# Patient Record
Sex: FEMALE | Race: BLACK OR AFRICAN AMERICAN | NOT HISPANIC OR LATINO | Employment: UNEMPLOYED | URBAN - METROPOLITAN AREA
[De-identification: names, ages, dates, MRNs, and addresses within clinical notes are randomized per-mention and may not be internally consistent; named-entity substitution may affect disease eponyms.]

---
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
## 2022-02-14 ENCOUNTER — DOCUMENTATION ONLY (OUTPATIENT)
Dept: GASTROENTEROLOGY | Facility: CLINIC | Age: 22
End: 2022-02-14
Payer: MEDICAID

## 2022-02-14 NOTE — Clinical Note
Patient will need an appt during her second trimester. I would like her to see Dr. Matta for this visit. Please schedule.

## 2022-02-14 NOTE — PROGRESS NOTES
Received labs dated 2/9/22  Hbg 11.4  Glucose 106  Sodium 134  Albumin 3.0    She will need a follow up visit during her second trimester and again in her third trimester.

## 2022-02-21 ENCOUNTER — PATIENT MESSAGE (OUTPATIENT)
Dept: GASTROENTEROLOGY | Facility: CLINIC | Age: 22
End: 2022-02-21
Payer: MEDICAID

## 2022-03-15 ENCOUNTER — OFFICE VISIT (OUTPATIENT)
Dept: GASTROENTEROLOGY | Facility: CLINIC | Age: 22
End: 2022-03-15
Payer: MEDICAID

## 2022-03-15 VITALS
HEIGHT: 64 IN | HEART RATE: 103 BPM | BODY MASS INDEX: 27.28 KG/M2 | WEIGHT: 159.81 LBS | DIASTOLIC BLOOD PRESSURE: 78 MMHG | SYSTOLIC BLOOD PRESSURE: 112 MMHG | OXYGEN SATURATION: 99 %

## 2022-03-15 DIAGNOSIS — D84.9 IMMUNOCOMPROMISED STATE: ICD-10-CM

## 2022-03-15 DIAGNOSIS — L30.9 ECZEMA, UNSPECIFIED TYPE: ICD-10-CM

## 2022-03-15 DIAGNOSIS — K50.10 CROHN'S DISEASE OF LARGE INTESTINE WITHOUT COMPLICATION: Primary | ICD-10-CM

## 2022-03-15 DIAGNOSIS — M35.2 BEHCET'S DISEASE: ICD-10-CM

## 2022-03-15 DIAGNOSIS — Z3A.22 22 WEEKS GESTATION OF PREGNANCY: ICD-10-CM

## 2022-03-15 PROCEDURE — 99214 PR OFFICE/OUTPT VISIT, EST, LEVL IV, 30-39 MIN: ICD-10-PCS | Mod: S$PBB,,, | Performed by: NURSE PRACTITIONER

## 2022-03-15 PROCEDURE — 3078F PR MOST RECENT DIASTOLIC BLOOD PRESSURE < 80 MM HG: ICD-10-PCS | Mod: CPTII,,, | Performed by: NURSE PRACTITIONER

## 2022-03-15 PROCEDURE — 1159F MED LIST DOCD IN RCRD: CPT | Mod: CPTII,,, | Performed by: NURSE PRACTITIONER

## 2022-03-15 PROCEDURE — 3008F BODY MASS INDEX DOCD: CPT | Mod: CPTII,,, | Performed by: NURSE PRACTITIONER

## 2022-03-15 PROCEDURE — 3074F SYST BP LT 130 MM HG: CPT | Mod: CPTII,,, | Performed by: NURSE PRACTITIONER

## 2022-03-15 PROCEDURE — 1159F PR MEDICATION LIST DOCUMENTED IN MEDICAL RECORD: ICD-10-PCS | Mod: CPTII,,, | Performed by: NURSE PRACTITIONER

## 2022-03-15 PROCEDURE — 1160F PR REVIEW ALL MEDS BY PRESCRIBER/CLIN PHARMACIST DOCUMENTED: ICD-10-PCS | Mod: CPTII,,, | Performed by: NURSE PRACTITIONER

## 2022-03-15 PROCEDURE — 99999 PR PBB SHADOW E&M-EST. PATIENT-LVL III: ICD-10-PCS | Mod: PBBFAC,,, | Performed by: NURSE PRACTITIONER

## 2022-03-15 PROCEDURE — 99213 OFFICE O/P EST LOW 20 MIN: CPT | Mod: PBBFAC | Performed by: NURSE PRACTITIONER

## 2022-03-15 PROCEDURE — 99214 OFFICE O/P EST MOD 30 MIN: CPT | Mod: S$PBB,,, | Performed by: NURSE PRACTITIONER

## 2022-03-15 PROCEDURE — 99999 PR PBB SHADOW E&M-EST. PATIENT-LVL III: CPT | Mod: PBBFAC,,, | Performed by: NURSE PRACTITIONER

## 2022-03-15 PROCEDURE — 3074F PR MOST RECENT SYSTOLIC BLOOD PRESSURE < 130 MM HG: ICD-10-PCS | Mod: CPTII,,, | Performed by: NURSE PRACTITIONER

## 2022-03-15 PROCEDURE — 3078F DIAST BP <80 MM HG: CPT | Mod: CPTII,,, | Performed by: NURSE PRACTITIONER

## 2022-03-15 PROCEDURE — 3008F PR BODY MASS INDEX (BMI) DOCUMENTED: ICD-10-PCS | Mod: CPTII,,, | Performed by: NURSE PRACTITIONER

## 2022-03-15 PROCEDURE — 1160F RVW MEDS BY RX/DR IN RCRD: CPT | Mod: CPTII,,, | Performed by: NURSE PRACTITIONER

## 2022-03-15 NOTE — PROGRESS NOTES
Clinic Follow Up:  [facility] Follow Up Note    Reason for Follow Up:  The primary encounter diagnosis was Crohn's disease of large intestine without complication. Diagnoses of Behcet's disease, Immunocompromised state, 22 weeks gestation of pregnancy, and Eczema, unspecified type were also pertinent to this visit.    PCP: Georgina Shah       HPI:  This is a 21 y.o. female here for follow up of Crohn's disease and Behcet's disease.      IBD Histroy  - Type: Crohn's disease  - Diagnosed: 2017  - Disease Location: large bowel only   - Surgeries related to IBD: none  - Extra-intestinal Manifestations: rashes-- face, arms, legs, mouth ulcers      Current Medications  Remicade 5 mg/kg every 5 weeks, due for next infusion tomorrow.      [facility]  [address], [place] [address] [phone]     Past Medications  Imuran, started 2015 for Behcet's disease--mouth ulcers, stopped in 2017-- patient discontinued medication     Drug and Antibody Levels   5/10/16Thiopurine metabolite-- low 6TG. undetected 6MMP   5/19/20 Infliximab drug level 6.1  10/1/20 Infliximab 1.2 no AB formation   1/14/21 infliximab 16, no AB formation.      Endoscopy Reports  11/21/17-- EGD: H. Pylori gastritis   11/21/17-- colonoscopy pathology report chronic active colitis with granulomas.   7/2020- no active disease.      Interval History  She is doing well. No complaints of flare. She reports seeing M and she was told that she didn't have to stop Remicade prior to delivery but that it was up to gastroenterology. She is unsure who she saw so will get in touch with her primary OB.      Preventative Medicine     Immunizations  - Influenza: 1/20/21  - Pnemococcal: PCV-13: due, order placed; PSV-23 due   - Hepatitis A/B: ?  - COVID 19- none      Cancer Prevention   - Date of last pap smear: currently pregnant,   - Date of last skin cancer screening: none, recommend yearly  - Date of last surveillance  colonoscopy: 2020, due  for dysplasia screening.      Bone Health  - Vitamin D level: low, takes prenatal vitamin   - Date of last DEXA: NA     Therapy Related Testing  - Date of last TB testin21 quant gold negative   - TPMT status: unable to see report      Miscellaneous  - Vitamin B 12 level (if ileal disease or resection): B12 high, folate normal   - Smoking status: none   - NSAID use: occasionally   - History of C. Diff: none   - Family planning: currently pregnant (22 weeks)     Recent Labs:   Lab Results   Component Value Date    WBC 5.38 2022    HGB 13.1 2022    HCT 38.5 2022     2022    ALT 16 2021    AST 17 2021    BUN 17 2021    CREATININE 0.7 2021    ALBUMIN 4.0 2021     2021    K 3.8 2021    GLU 91 2021     Lab Results   Component Value Date    CRP 0.5 12/10/2020     Lab Results   Component Value Date    HEPBSAB Grayzone (A) 2021    HEPBSAG Negative 2022    HEPBCAB Negative 2021     Lab Results   Component Value Date    POXPRQEF36 1194 (H) 07/15/2020    FOLATE 9.0 07/15/2020     Lab Results   Component Value Date    QHJKPVEV73MH 7 (L) 2021     No results found for: TBGOLD     Review of Systems    Medical History:  Past Medical History:   Diagnosis Date    Behcet recurrent disease     Crohn's colitis        Surgical History:   Past Surgical History:   Procedure Laterality Date    COLON BIOPSY      COLONOSCOPY N/A 2020    Procedure: COLONOSCOPY;  Surgeon: Addy Gupta MD;  Location: [facility];  Service: Pediatrics;  Laterality: N/A;    ESOPHAGOGASTRODUODENOSCOPY N/A 2020    Procedure: EGD (ESOPHAGOGASTRODUODENOSCOPY);  Surgeon: Addy Gupta MD;  Location: [facility];  Service: Pediatrics;  Laterality: N/A;    kidney biopay         Family History:   Family History   Problem Relation Age of Onset    No Known Problems Father     Diabetes Mother        Social  "History:   Social History     Tobacco Use    Smoking status: Never Smoker    Smokeless tobacco: Never Used   Substance Use Topics    Alcohol use: No    Drug use: No       Allergies:   Review of patient's allergies indicates:   Allergen Reactions    Sulfamethoxazole-trimethoprim Swelling    Sulfa (sulfonamide antibiotics)     Amoxicillin Rash    Augmentin [amoxicillin-pot clavulanate] Rash       Home Medications:  Current Outpatient Medications on File Prior to Visit   Medication Sig Dispense Refill    infliximab (REMICADE IV) Inject into the vein.      prenatal vit-iron fum-folic ac (PRENATAL VITAMIN WITH MINERALS) 28 mg iron- 800 mcg Tab Take 1 tablet by mouth once daily. 30 tablet 11    promethazine (PHENERGAN) 25 MG tablet Take 1 tablet (25 mg total) by mouth every 6 (six) hours as needed for Nausea. (Patient not taking: Reported on 3/15/2022) 30 tablet 0     No current facility-administered medications on file prior to visit.       /78 (BP Location: Left arm, Patient Position: Sitting, BP Method: Medium (Manual))   Pulse 103   Ht 5' 4" (1.626 m)   Wt 72.5 kg (159 lb 13.3 oz)   LMP 09/24/2021 (Exact Date)   SpO2 99%   BMI 27.44 kg/m²   Body mass index is 27.44 kg/m².  Physical Exam    Labs: Pertinent labs reviewed.  CRC Screening:     Assessment:   1. Crohn's disease of large intestine without complication    2. Behcet's disease    3. Immunocompromised state    4. 22 weeks gestation of pregnancy    5. Eczema, unspecified type        Recommendations:   - since she gets Remicade infusions every 5 weeks, will discontinue use at 34 weeks gestation. I will contact her OB for M recommendations to continue Remicade throughout pregnancy. Will notify patient if there is a change to the above.   - dermatology referral for eczema as well as skin cancer screening.     Crohn's disease of large intestine without complication  -     Ambulatory referral/consult to Dermatology; Future; Expected date: " 03/22/2022    Behcet's disease    Immunocompromised state    22 weeks gestation of pregnancy    Eczema, unspecified type  -     Ambulatory referral/consult to Dermatology; Future; Expected date: 03/22/2022    Return to Clinic:  Follow up for in third trimester.    Thank you for the opportunity to participate in the care of this patient.  SAMANTHA Medina

## 2022-03-21 ENCOUNTER — DOCUMENTATION ONLY (OUTPATIENT)
Dept: GASTROENTEROLOGY | Facility: CLINIC | Age: 22
End: 2022-03-21
Payer: MEDICAID

## 2022-04-21 ENCOUNTER — OFFICE VISIT (OUTPATIENT)
Dept: DERMATOLOGY | Facility: CLINIC | Age: 22
End: 2022-04-21
Payer: MEDICAID

## 2022-04-21 DIAGNOSIS — K50.10 CROHN'S DISEASE OF LARGE INTESTINE WITHOUT COMPLICATION: ICD-10-CM

## 2022-04-21 DIAGNOSIS — L30.9 ECZEMA, UNSPECIFIED TYPE: ICD-10-CM

## 2022-04-21 PROCEDURE — 99203 OFFICE O/P NEW LOW 30 MIN: CPT | Mod: S$PBB,,, | Performed by: PHYSICIAN ASSISTANT

## 2022-04-21 PROCEDURE — 1159F MED LIST DOCD IN RCRD: CPT | Mod: CPTII,,, | Performed by: PHYSICIAN ASSISTANT

## 2022-04-21 PROCEDURE — 99999 PR PBB SHADOW E&M-EST. PATIENT-LVL III: ICD-10-PCS | Mod: PBBFAC,,, | Performed by: PHYSICIAN ASSISTANT

## 2022-04-21 PROCEDURE — 1159F PR MEDICATION LIST DOCUMENTED IN MEDICAL RECORD: ICD-10-PCS | Mod: CPTII,,, | Performed by: PHYSICIAN ASSISTANT

## 2022-04-21 PROCEDURE — 99999 PR PBB SHADOW E&M-EST. PATIENT-LVL III: CPT | Mod: PBBFAC,,, | Performed by: PHYSICIAN ASSISTANT

## 2022-04-21 PROCEDURE — 99213 OFFICE O/P EST LOW 20 MIN: CPT | Mod: PBBFAC | Performed by: PHYSICIAN ASSISTANT

## 2022-04-21 PROCEDURE — 1160F PR REVIEW ALL MEDS BY PRESCRIBER/CLIN PHARMACIST DOCUMENTED: ICD-10-PCS | Mod: CPTII,,, | Performed by: PHYSICIAN ASSISTANT

## 2022-04-21 PROCEDURE — 1160F RVW MEDS BY RX/DR IN RCRD: CPT | Mod: CPTII,,, | Performed by: PHYSICIAN ASSISTANT

## 2022-04-21 PROCEDURE — 99203 PR OFFICE/OUTPT VISIT, NEW, LEVL III, 30-44 MIN: ICD-10-PCS | Mod: S$PBB,,, | Performed by: PHYSICIAN ASSISTANT

## 2022-04-21 RX ORDER — TRIAMCINOLONE ACETONIDE 0.25 MG/G
CREAM TOPICAL 2 TIMES DAILY
Qty: 30 G | Refills: 0 | Status: SHIPPED | OUTPATIENT
Start: 2022-04-21 | End: 2023-06-06

## 2022-04-21 NOTE — PROGRESS NOTES
Subjective:       Patient ID:  Barbara Mari is a 21 y.o. female who presents for   Chief Complaint   Patient presents with    Skin Check     Pt has Crohn's disease     Hx of Crohn's (on remicade and followed by GI), Behcet's, 26 weeks gestation, referred by GI for derm evaluation of eczema / rash; however, pt denies rash today.  However, she does endorses fine bumps and dryness of the proximal arms, occasional itching, duration 1 + year. Not using emollients, endorses long showers and hot water. Previous tx: aquaphor.    She is on remicade and needs full skin exam. Denies any new or changing lesions.     Reviewed external GI notes       Review of Systems   Constitutional: Negative for fever and chills.   Gastrointestinal: Negative for nausea and vomiting.   Skin: Positive for itching, dry skin and activity-related sunscreen use. Negative for rash, sun sensitivity, daily sunscreen use, recent sunburn, dry lips and abscesses.   Hematologic/Lymphatic: Does not bruise/bleed easily.        Objective:    Physical Exam   Constitutional: She appears well-developed and well-nourished. No distress.   Genitourinary:         Neurological: She is alert and oriented to person, place, and time. She is not disoriented.   Psychiatric: She has a normal mood and affect.   Skin:   Areas Examined (abnormalities noted in diagram):   Scalp / Hair Palpated and Inspected  Head / Face Inspection Performed  Neck Inspection Performed  Chest / Axilla Inspection Performed  Abdomen Inspection Performed  Genitals / Buttocks / Groin Inspection Performed  Back Inspection Performed  RUE Inspected  LUE Inspection Performed  RLE Inspected  LLE Inspection Performed  Nails and Digits Inspection Performed              Diagram Legend     Erythematous scaling macule/papule c/w actinic keratosis       Vascular papule c/w angioma      Pigmented verrucoid papule/plaque c/w seborrheic keratosis      Yellow umbilicated papule c/w sebaceous hyperplasia       Irregularly shaped tan macule c/w lentigo     1-2 mm smooth white papules consistent with Milia      Movable subcutaneous cyst with punctum c/w epidermal inclusion cyst      Subcutaneous movable cyst c/w pilar cyst      Firm pink to brown papule c/w dermatofibroma      Pedunculated fleshy papule(s) c/w skin tag(s)      Evenly pigmented macule c/w junctional nevus     Mildly variegated pigmented, slightly irregular-bordered macule c/w mildly atypical nevus      Flesh colored to evenly pigmented papule c/w intradermal nevus       Pink pearly papule/plaque c/w basal cell carcinoma      Erythematous hyperkeratotic cursted plaque c/w SCC      Surgical scar with no sign of skin cancer recurrence      Open and closed comedones      Inflammatory papules and pustules      Verrucoid papule consistent consistent with wart     Erythematous eczematous patches and plaques     Dystrophic onycholytic nail with subungual debris c/w onychomycosis     Umbilicated papule    Erythematous-base heme-crusted tan verrucoid plaque consistent with inflamed seborrheic keratosis     Erythematous Silvery Scaling Plaque c/w Psoriasis     See annotation      Assessment / Plan:       Eczema, unspecified type  -     Ambulatory referral/consult to Dermatology  -     triamcinolone acetonide 0.025% (KENALOG) 0.025 % cream; Apply topically 2 (two) times daily.  Dispense: 30 g; Refill: 0  Mild, ddx: KP vs other eczematous vs. less likely photosensitivity   Trial of above rx, gentle skin care and liberal emollients. May usse above rx for up to 4 weeks.     Crohn's disease of large intestine without complication  -     Ambulatory referral/consult to Dermatology  No lesions of concern today on exam. Encouraged regular skin exams for surveillance. Discussed potential for increased risk of skin CA related to immune suppression (remicade).               Follow up in about 6 months (around 10/21/2022).

## 2022-04-22 ENCOUNTER — PATIENT MESSAGE (OUTPATIENT)
Dept: GASTROENTEROLOGY | Facility: CLINIC | Age: 22
End: 2022-04-22
Payer: MEDICAID

## 2022-04-22 ENCOUNTER — DOCUMENTATION ONLY (OUTPATIENT)
Dept: GASTROENTEROLOGY | Facility: CLINIC | Age: 22
End: 2022-04-22
Payer: MEDICAID

## 2022-04-22 NOTE — PROGRESS NOTES
Received and reviewed medical records from [facility]  - hgb decreased but patient is now pregnant. She needs to make sure she is taking a prenatal vitamin with iron.

## 2022-04-25 NOTE — TELEPHONE ENCOUNTER
Spoke to patient. I informed her of the results. She verbalized understanding and will get a prenatal vitamin with iron in it.

## 2022-05-10 ENCOUNTER — OFFICE VISIT (OUTPATIENT)
Dept: GASTROENTEROLOGY | Facility: CLINIC | Age: 22
End: 2022-05-10
Payer: MEDICAID

## 2022-05-10 VITALS
DIASTOLIC BLOOD PRESSURE: 60 MMHG | HEIGHT: 64 IN | RESPIRATION RATE: 14 BRPM | HEART RATE: 66 BPM | WEIGHT: 170.19 LBS | BODY MASS INDEX: 29.06 KG/M2 | SYSTOLIC BLOOD PRESSURE: 112 MMHG

## 2022-05-10 DIAGNOSIS — D84.9 IMMUNOCOMPROMISED STATE: ICD-10-CM

## 2022-05-10 DIAGNOSIS — M35.2 BEHCET'S DISEASE: ICD-10-CM

## 2022-05-10 DIAGNOSIS — Z3A.30 30 WEEKS GESTATION OF PREGNANCY: ICD-10-CM

## 2022-05-10 DIAGNOSIS — K50.10 CROHN'S DISEASE OF LARGE INTESTINE WITHOUT COMPLICATION: Primary | ICD-10-CM

## 2022-05-10 PROCEDURE — 99213 PR OFFICE/OUTPT VISIT, EST, LEVL III, 20-29 MIN: ICD-10-PCS | Mod: S$PBB,,, | Performed by: NURSE PRACTITIONER

## 2022-05-10 PROCEDURE — 3008F BODY MASS INDEX DOCD: CPT | Mod: CPTII,,, | Performed by: NURSE PRACTITIONER

## 2022-05-10 PROCEDURE — 99999 PR PBB SHADOW E&M-EST. PATIENT-LVL III: ICD-10-PCS | Mod: PBBFAC,,, | Performed by: NURSE PRACTITIONER

## 2022-05-10 PROCEDURE — 3008F PR BODY MASS INDEX (BMI) DOCUMENTED: ICD-10-PCS | Mod: CPTII,,, | Performed by: NURSE PRACTITIONER

## 2022-05-10 PROCEDURE — 1160F PR REVIEW ALL MEDS BY PRESCRIBER/CLIN PHARMACIST DOCUMENTED: ICD-10-PCS | Mod: CPTII,,, | Performed by: NURSE PRACTITIONER

## 2022-05-10 PROCEDURE — 3078F PR MOST RECENT DIASTOLIC BLOOD PRESSURE < 80 MM HG: ICD-10-PCS | Mod: CPTII,,, | Performed by: NURSE PRACTITIONER

## 2022-05-10 PROCEDURE — 1159F PR MEDICATION LIST DOCUMENTED IN MEDICAL RECORD: ICD-10-PCS | Mod: CPTII,,, | Performed by: NURSE PRACTITIONER

## 2022-05-10 PROCEDURE — 1159F MED LIST DOCD IN RCRD: CPT | Mod: CPTII,,, | Performed by: NURSE PRACTITIONER

## 2022-05-10 PROCEDURE — 99213 OFFICE O/P EST LOW 20 MIN: CPT | Mod: PBBFAC | Performed by: NURSE PRACTITIONER

## 2022-05-10 PROCEDURE — 99213 OFFICE O/P EST LOW 20 MIN: CPT | Mod: S$PBB,,, | Performed by: NURSE PRACTITIONER

## 2022-05-10 PROCEDURE — 3078F DIAST BP <80 MM HG: CPT | Mod: CPTII,,, | Performed by: NURSE PRACTITIONER

## 2022-05-10 PROCEDURE — 3074F SYST BP LT 130 MM HG: CPT | Mod: CPTII,,, | Performed by: NURSE PRACTITIONER

## 2022-05-10 PROCEDURE — 99999 PR PBB SHADOW E&M-EST. PATIENT-LVL III: CPT | Mod: PBBFAC,,, | Performed by: NURSE PRACTITIONER

## 2022-05-10 PROCEDURE — 1160F RVW MEDS BY RX/DR IN RCRD: CPT | Mod: CPTII,,, | Performed by: NURSE PRACTITIONER

## 2022-05-10 PROCEDURE — 3074F PR MOST RECENT SYSTOLIC BLOOD PRESSURE < 130 MM HG: ICD-10-PCS | Mod: CPTII,,, | Performed by: NURSE PRACTITIONER

## 2022-05-10 NOTE — PROGRESS NOTES
Clinic Follow Up:  [facility] Follow Up Note    Reason for Follow Up:  The primary encounter diagnosis was Crohn's disease of large intestine without complication. Diagnoses of Behcet's disease, Immunocompromised state, and 30 weeks gestation of pregnancy were also pertinent to this visit.    PCP: Georgina Shah       HPI:  This is a 21 y.o. female here for follow up of Crohn's disease.   She is due for her next Remicade infusion on 5/25 or 5/26.   She is now in her third trimester and she continues to do well. No current GI symptoms. No abdominal pain, hematochezia, melena, nausea, vomiting, or weight loss.    We discussed discontinuing Remicade at 34 weeks and to restart immediately after delivery pending no infection. She saw M (she does not recall the providers name) and was told she could continue Remicade throughout pregnancy and not hold before delivery.     Review of Systems   Constitutional: Negative for activity change and appetite change.        As per interval history above   Respiratory: Negative for cough and shortness of breath.    Cardiovascular: Negative for chest pain.   Gastrointestinal: Negative for abdominal pain, anal bleeding, blood in stool, constipation, diarrhea, nausea, rectal pain and vomiting.   Skin: Negative for color change and rash.       Medical History:  Past Medical History:   Diagnosis Date    Behcet recurrent disease     Crohn's colitis        Surgical History:   Past Surgical History:   Procedure Laterality Date    COLON BIOPSY      COLONOSCOPY N/A 7/7/2020    Procedure: COLONOSCOPY;  Surgeon: Addy Gupta MD;  Location: [facility];  Service: Pediatrics;  Laterality: N/A;    ESOPHAGOGASTRODUODENOSCOPY N/A 7/7/2020    Procedure: EGD (ESOPHAGOGASTRODUODENOSCOPY);  Surgeon: Addy Gupta MD;  Location: [facility];  Service: Pediatrics;  Laterality: N/A;    kidney biopay         Family History:   Family History   Problem Relation Age of Onset    No  "Known Problems Father     Diabetes Mother        Social History:   Social History     Tobacco Use    Smoking status: Never Smoker    Smokeless tobacco: Never Used   Substance Use Topics    Alcohol use: No    Drug use: No       Allergies:   Review of patient's allergies indicates:   Allergen Reactions    Sulfamethoxazole-trimethoprim Swelling    Sulfa (sulfonamide antibiotics)     Amoxicillin Rash    Augmentin [amoxicillin-pot clavulanate] Rash       Home Medications:  Current Outpatient Medications on File Prior to Visit   Medication Sig Dispense Refill    infliximab (REMICADE IV) Inject into the vein.      prenatal vit-iron fum-folic ac (PRENATAL VITAMIN WITH MINERALS) 28 mg iron- 800 mcg Tab Take 1 tablet by mouth once daily. 30 tablet 11    promethazine (PHENERGAN) 25 MG tablet Take 1 tablet (25 mg total) by mouth every 6 (six) hours as needed for Nausea. 30 tablet 0    triamcinolone acetonide 0.025% (KENALOG) 0.025 % cream Apply topically 2 (two) times daily. 30 g 0     No current facility-administered medications on file prior to visit.       /60   Pulse 66   Resp 14   Ht 5' 4" (1.626 m)   Wt 77.2 kg (170 lb 3.1 oz)   LMP 09/24/2021 (Exact Date)   BMI 29.21 kg/m²   Body mass index is 29.21 kg/m².  Physical Exam  Constitutional:       General: She is not in acute distress.  HENT:      Head: Normocephalic and atraumatic.   Eyes:      General: No scleral icterus.     Conjunctiva/sclera: Conjunctivae normal.   Cardiovascular:      Rate and Rhythm: Normal rate and regular rhythm.      Heart sounds: No murmur heard.  Pulmonary:      Effort: Pulmonary effort is normal. No respiratory distress.      Breath sounds: Normal breath sounds. No wheezing.   Abdominal:      General: Abdomen is flat. Bowel sounds are normal.      Palpations: Abdomen is soft.      Tenderness: There is no abdominal tenderness.   Skin:     General: Skin is warm and dry.   Neurological:      General: No focal deficit " present.      Mental Status: She is alert and oriented to person, place, and time.      Cranial Nerves: No cranial nerve deficit.   Psychiatric:         Mood and Affect: Mood normal.         Judgment: Judgment normal.         Labs: Pertinent labs reviewed.    Assessment:   1. Crohn's disease of large intestine without complication    2. Behcet's disease    3. Immunocompromised state    4. 30 weeks gestation of pregnancy      Patient with crohn's disease in remission on Remicade. She is asymptomatic.     Recommendations:   - continue regular OB appts  - hold Remicade at 34 weeks.   - message sent to OB regarding MFM recommendations to continue Remicade. Will notify patient once I have discussed with Dr. Camilo.     Crohn's disease of large intestine without complication    Behcet's disease    Immunocompromised state    30 weeks gestation of pregnancy      Thank you for the opportunity to participate in the care of this patient.  SAMANTHA Medina

## 2022-05-31 ENCOUNTER — DOCUMENTATION ONLY (OUTPATIENT)
Dept: GASTROENTEROLOGY | Facility: CLINIC | Age: 22
End: 2022-05-31
Payer: MEDICAID

## 2022-07-14 ENCOUNTER — TELEPHONE (OUTPATIENT)
Dept: GASTROENTEROLOGY | Facility: CLINIC | Age: 22
End: 2022-07-14
Payer: MEDICAID

## 2022-07-14 NOTE — TELEPHONE ENCOUNTER
Spoke to [facility]. They are needing updated clinicals for this patient. Clinicals faxed to [phone]. Fax confirmation received.

## 2022-07-14 NOTE — TELEPHONE ENCOUNTER
----- Message from Christiano Todd MA sent at 7/14/2022  3:39 PM CDT -----    ----- Message -----  From: Destini Qiu  Sent: 7/14/2022   3:37 PM CDT  To: Celestina Recio calling from [facility] Re: Clinical's please call her [phone] with information    Thanks bs

## 2022-07-21 ENCOUNTER — TELEPHONE (OUTPATIENT)
Dept: GASTROENTEROLOGY | Facility: CLINIC | Age: 22
End: 2022-07-21
Payer: MEDICAID

## 2022-07-21 NOTE — TELEPHONE ENCOUNTER
----- Message from Cosmo De La Paz sent at 7/20/2022  3:14 PM CDT -----  Contact: [id number]  Calling requesting clinical notes for pt , need it for authorization for pt medication , also states she have reached out before and no one has called back . Please all back at [phone] . Thanks/dj

## 2022-07-29 ENCOUNTER — PATIENT MESSAGE (OUTPATIENT)
Dept: GASTROENTEROLOGY | Facility: CLINIC | Age: 22
End: 2022-07-29
Payer: MEDICAID

## 2022-08-08 ENCOUNTER — DOCUMENTATION ONLY (OUTPATIENT)
Dept: GASTROENTEROLOGY | Facility: CLINIC | Age: 22
End: 2022-08-08
Payer: MEDICAID

## 2022-09-15 ENCOUNTER — LAB VISIT (OUTPATIENT)
Dept: LAB | Facility: HOSPITAL | Age: 22
End: 2022-09-15
Attending: NURSE PRACTITIONER
Payer: MEDICAID

## 2022-09-15 ENCOUNTER — OFFICE VISIT (OUTPATIENT)
Dept: GASTROENTEROLOGY | Facility: CLINIC | Age: 22
End: 2022-09-15
Payer: MEDICAID

## 2022-09-15 VITALS
SYSTOLIC BLOOD PRESSURE: 122 MMHG | BODY MASS INDEX: 28.79 KG/M2 | WEIGHT: 168.63 LBS | HEIGHT: 64 IN | HEART RATE: 101 BPM | DIASTOLIC BLOOD PRESSURE: 64 MMHG

## 2022-09-15 DIAGNOSIS — K50.10 CROHN'S DISEASE OF LARGE INTESTINE WITHOUT COMPLICATION: Primary | ICD-10-CM

## 2022-09-15 DIAGNOSIS — D84.9 IMMUNOCOMPROMISED STATE: ICD-10-CM

## 2022-09-15 DIAGNOSIS — M35.2 BEHCET'S DISEASE: ICD-10-CM

## 2022-09-15 DIAGNOSIS — K50.10 CROHN'S DISEASE OF LARGE INTESTINE WITHOUT COMPLICATION: ICD-10-CM

## 2022-09-15 LAB
ALBUMIN SERPL BCP-MCNC: 3.6 G/DL (ref 3.5–5.2)
ALP SERPL-CCNC: 80 U/L (ref 55–135)
ALT SERPL W/O P-5'-P-CCNC: 15 U/L (ref 10–44)
ANION GAP SERPL CALC-SCNC: 8 MMOL/L (ref 8–16)
AST SERPL-CCNC: 17 U/L (ref 10–40)
BASOPHILS # BLD AUTO: 0.01 K/UL (ref 0–0.2)
BASOPHILS NFR BLD: 0.2 % (ref 0–1.9)
BILIRUB SERPL-MCNC: 0.8 MG/DL (ref 0.1–1)
BUN SERPL-MCNC: 12 MG/DL (ref 6–20)
CALCIUM SERPL-MCNC: 9.2 MG/DL (ref 8.7–10.5)
CHLORIDE SERPL-SCNC: 104 MMOL/L (ref 95–110)
CO2 SERPL-SCNC: 25 MMOL/L (ref 23–29)
CREAT SERPL-MCNC: 0.8 MG/DL (ref 0.5–1.4)
DIFFERENTIAL METHOD: ABNORMAL
EOSINOPHIL # BLD AUTO: 0 K/UL (ref 0–0.5)
EOSINOPHIL NFR BLD: 0.4 % (ref 0–8)
ERYTHROCYTE [DISTWIDTH] IN BLOOD BY AUTOMATED COUNT: 15 % (ref 11.5–14.5)
EST. GFR  (NO RACE VARIABLE): >60 ML/MIN/1.73 M^2
GLUCOSE SERPL-MCNC: 118 MG/DL (ref 70–110)
HCT VFR BLD AUTO: 35.7 % (ref 37–48.5)
HGB BLD-MCNC: 11.1 G/DL (ref 12–16)
IMM GRANULOCYTES # BLD AUTO: 0.01 K/UL (ref 0–0.04)
IMM GRANULOCYTES NFR BLD AUTO: 0.2 % (ref 0–0.5)
LYMPHOCYTES # BLD AUTO: 1.3 K/UL (ref 1–4.8)
LYMPHOCYTES NFR BLD: 26.7 % (ref 18–48)
MCH RBC QN AUTO: 27.1 PG (ref 27–31)
MCHC RBC AUTO-ENTMCNC: 31.1 G/DL (ref 32–36)
MCV RBC AUTO: 87 FL (ref 82–98)
MONOCYTES # BLD AUTO: 0.3 K/UL (ref 0.3–1)
MONOCYTES NFR BLD: 6.4 % (ref 4–15)
NEUTROPHILS # BLD AUTO: 3.2 K/UL (ref 1.8–7.7)
NEUTROPHILS NFR BLD: 66.1 % (ref 38–73)
NRBC BLD-RTO: 0 /100 WBC
PLATELET # BLD AUTO: 277 K/UL (ref 150–450)
PMV BLD AUTO: 10.5 FL (ref 9.2–12.9)
POTASSIUM SERPL-SCNC: 3.8 MMOL/L (ref 3.5–5.1)
PROT SERPL-MCNC: 7.2 G/DL (ref 6–8.4)
RBC # BLD AUTO: 4.09 M/UL (ref 4–5.4)
SODIUM SERPL-SCNC: 137 MMOL/L (ref 136–145)
WBC # BLD AUTO: 4.83 K/UL (ref 3.9–12.7)

## 2022-09-15 PROCEDURE — 99999 PR PBB SHADOW E&M-EST. PATIENT-LVL III: ICD-10-PCS | Mod: PBBFAC,,, | Performed by: NURSE PRACTITIONER

## 2022-09-15 PROCEDURE — 85025 COMPLETE CBC W/AUTO DIFF WBC: CPT | Performed by: NURSE PRACTITIONER

## 2022-09-15 PROCEDURE — 3078F PR MOST RECENT DIASTOLIC BLOOD PRESSURE < 80 MM HG: ICD-10-PCS | Mod: CPTII,,, | Performed by: NURSE PRACTITIONER

## 2022-09-15 PROCEDURE — 99213 OFFICE O/P EST LOW 20 MIN: CPT | Mod: PBBFAC | Performed by: NURSE PRACTITIONER

## 2022-09-15 PROCEDURE — 82746 ASSAY OF FOLIC ACID SERUM: CPT | Performed by: NURSE PRACTITIONER

## 2022-09-15 PROCEDURE — 1160F PR REVIEW ALL MEDS BY PRESCRIBER/CLIN PHARMACIST DOCUMENTED: ICD-10-PCS | Mod: CPTII,,, | Performed by: NURSE PRACTITIONER

## 2022-09-15 PROCEDURE — 99214 PR OFFICE/OUTPT VISIT, EST, LEVL IV, 30-39 MIN: ICD-10-PCS | Mod: S$PBB,,, | Performed by: NURSE PRACTITIONER

## 2022-09-15 PROCEDURE — 80053 COMPREHEN METABOLIC PANEL: CPT | Performed by: NURSE PRACTITIONER

## 2022-09-15 PROCEDURE — 99999 PR PBB SHADOW E&M-EST. PATIENT-LVL III: CPT | Mod: PBBFAC,,, | Performed by: NURSE PRACTITIONER

## 2022-09-15 PROCEDURE — 1159F PR MEDICATION LIST DOCUMENTED IN MEDICAL RECORD: ICD-10-PCS | Mod: CPTII,,, | Performed by: NURSE PRACTITIONER

## 2022-09-15 PROCEDURE — 82607 VITAMIN B-12: CPT | Performed by: NURSE PRACTITIONER

## 2022-09-15 PROCEDURE — 3078F DIAST BP <80 MM HG: CPT | Mod: CPTII,,, | Performed by: NURSE PRACTITIONER

## 2022-09-15 PROCEDURE — 3074F SYST BP LT 130 MM HG: CPT | Mod: CPTII,,, | Performed by: NURSE PRACTITIONER

## 2022-09-15 PROCEDURE — 3074F PR MOST RECENT SYSTOLIC BLOOD PRESSURE < 130 MM HG: ICD-10-PCS | Mod: CPTII,,, | Performed by: NURSE PRACTITIONER

## 2022-09-15 PROCEDURE — 3008F PR BODY MASS INDEX (BMI) DOCUMENTED: ICD-10-PCS | Mod: CPTII,,, | Performed by: NURSE PRACTITIONER

## 2022-09-15 PROCEDURE — 1159F MED LIST DOCD IN RCRD: CPT | Mod: CPTII,,, | Performed by: NURSE PRACTITIONER

## 2022-09-15 PROCEDURE — 36415 COLL VENOUS BLD VENIPUNCTURE: CPT | Performed by: NURSE PRACTITIONER

## 2022-09-15 PROCEDURE — 86480 TB TEST CELL IMMUN MEASURE: CPT | Performed by: NURSE PRACTITIONER

## 2022-09-15 PROCEDURE — 99214 OFFICE O/P EST MOD 30 MIN: CPT | Mod: S$PBB,,, | Performed by: NURSE PRACTITIONER

## 2022-09-15 PROCEDURE — 3008F BODY MASS INDEX DOCD: CPT | Mod: CPTII,,, | Performed by: NURSE PRACTITIONER

## 2022-09-15 PROCEDURE — 1160F RVW MEDS BY RX/DR IN RCRD: CPT | Mod: CPTII,,, | Performed by: NURSE PRACTITIONER

## 2022-09-15 PROCEDURE — 82306 VITAMIN D 25 HYDROXY: CPT | Performed by: NURSE PRACTITIONER

## 2022-09-15 RX ORDER — NORGESTIMATE AND ETHINYL ESTRADIOL 7DAYSX3 LO
1 KIT ORAL DAILY
COMMUNITY
End: 2022-09-15 | Stop reason: SDUPTHER

## 2022-09-15 RX ORDER — INFLIXIMAB 100 MG/10ML
INJECTION, POWDER, LYOPHILIZED, FOR SOLUTION INTRAVENOUS
COMMUNITY
Start: 2022-02-14 | End: 2023-08-14

## 2022-09-15 RX ORDER — NORGESTIMATE AND ETHINYL ESTRADIOL 7DAYSX3 LO
KIT ORAL
COMMUNITY
End: 2023-06-06

## 2022-09-15 NOTE — PATIENT INSTRUCTIONS
Follow high fiber diet and can use Miralax as needed.     Fiber, along with fluid intake, can help your digestive tract function properly. A high-fiber diet can also help reduce the risk of obesity, heart disease, and diabetes.   Women should try to eat at least 21-25 grams of fiber a day. Men should try to eat at least 30-38 grams of fiber a day.   See the chart below for some of the more common foods and how much fiber they contain per serving. You should also drink at least 64 ounces of water per day when following a high fiber diet. When buying packaged foods, check the nutrition food label for fiber content.     Amount of fiber in different foods  Food Serving Grams of fiber   Fruits   Apple (with skin) 1 medium apple 4.4   Banana 1 medium banana 3.1   Oranges 1 orange 3.1          Prunes 1 cup, pitted 12.4   Juices   Apple, unsweetened, with added ascorbic acid 1 cup 0.5   Grapefruit, white, canned, sweetened 1 cup 0.2   Grape, unsweetened, with added ascorbic acid 1 cup 0.5          Orange 1 cup 0.7   Vegetables   Cooked    Green beans 1 cup 4.0    Carrots 1/2 cup sliced 2.3   Peas 1 cup 8.8   Potato (baked, with skin) 1 medium potato 3.8   Raw   Cucumber (with peel) 1 cucumber 1.5   Lettuce 1 cup shredded 0.5   Tomato 1 medium tomato 1.5   Spinach 1 cup 0.7   Legumes   Baked beans, canned, no salt added 1 cup 13.9   Kidney beans, canned 1 cup 13.6   Lima beans, canned 1 cup 11.6               Lentils, boiled 1 cup 15.6   Breads, pastas, flours   Bran muffins 1 medium muffin 5.2   Oatmeal, cooked 1 cup 4.0   White bread 1 slice 0.6   Whole-wheat bread 1 slice 1.9   Pasta and rice, cooked   Macaroni 1 cup 2.5   Rice, brown 1 cup 3.5   Rice, white 1 cup 0.6               Spaghetti (regular) 1 cup 2.5   Nuts   Almonds 1/2 cup 8.7   Peanuts 1/2 cup 7.9     To learn how much fiber and other nutrients are in different foods, visit the [place] Department of Agriculture (USDA) FoodData Central website.  Data  from: USDA FoodData Central. Available at: https://fdc.nal.usda.gov/ (Accessed on October 11, 2019).  Graphic 20665 Version 6.0  © 2022 UpToDate, Inc. and/or its affiliates. All Rights Reserved.

## 2022-09-16 DIAGNOSIS — E55.9 VITAMIN D DEFICIENCY: Primary | ICD-10-CM

## 2022-09-16 LAB
25(OH)D3+25(OH)D2 SERPL-MCNC: 14 NG/ML (ref 30–96)
FOLATE SERPL-MCNC: 15.4 NG/ML (ref 4–24)
VIT B12 SERPL-MCNC: 464 PG/ML (ref 210–950)

## 2022-09-16 RX ORDER — ASPIRIN 325 MG
50000 TABLET, DELAYED RELEASE (ENTERIC COATED) ORAL WEEKLY
Qty: 12 CAPSULE | Refills: 0 | Status: SHIPPED | OUTPATIENT
Start: 2022-09-16 | End: 2022-12-15

## 2022-09-17 LAB
GAMMA INTERFERON BACKGROUND BLD IA-ACNC: 0.02 IU/ML
M TB IFN-G CD4+ BCKGRND COR BLD-ACNC: 0.01 IU/ML
MITOGEN IGNF BCKGRD COR BLD-ACNC: 7.07 IU/ML
TB GOLD PLUS: NEGATIVE
TB2 - NIL: 0 IU/ML

## 2022-09-19 ENCOUNTER — TREATMENT PLANNING (OUTPATIENT)
Dept: GASTROENTEROLOGY | Facility: CLINIC | Age: 22
End: 2022-09-19
Payer: MEDICAID

## 2022-09-20 ENCOUNTER — PATIENT MESSAGE (OUTPATIENT)
Dept: GASTROENTEROLOGY | Facility: CLINIC | Age: 22
End: 2022-09-20
Payer: MEDICAID

## 2022-09-21 NOTE — PROGRESS NOTES
IBD Conference / Treatment Planning    Patient's case discussed by: ABHISHEK Shearer, CHAYOP, and Sonia Cortes, RN, BSN    Dx: Crohn's    Reason to Review: Patient is starting new job and wanting to change biologic due to not being able to get infusions every 5 weeks    Plan: Change infusions to 5mg/kg every 8 weeks  Get trough drug levels before 3rd dose at [facility]

## 2022-09-26 ENCOUNTER — PATIENT MESSAGE (OUTPATIENT)
Dept: GASTROENTEROLOGY | Facility: CLINIC | Age: 22
End: 2022-09-26
Payer: MEDICAID

## 2022-10-04 DIAGNOSIS — K50.10 CROHN'S DISEASE OF LARGE INTESTINE WITHOUT COMPLICATION: Primary | ICD-10-CM

## 2022-11-18 ENCOUNTER — DOCUMENTATION ONLY (OUTPATIENT)
Dept: GASTROENTEROLOGY | Facility: CLINIC | Age: 22
End: 2022-11-18
Payer: MEDICAID

## 2023-01-17 ENCOUNTER — PATIENT MESSAGE (OUTPATIENT)
Dept: GASTROENTEROLOGY | Facility: CLINIC | Age: 23
End: 2023-01-17
Payer: MEDICAID

## 2023-01-30 ENCOUNTER — DOCUMENTATION ONLY (OUTPATIENT)
Dept: GASTROENTEROLOGY | Facility: CLINIC | Age: 23
End: 2023-01-30
Payer: MEDICAID

## 2023-01-30 NOTE — PROGRESS NOTES
Received lab results from [facility] . CBC/CMP drawn on 1/5/23  Hgb 11.0  HCT 35.3  Otherwise essentially normal

## 2023-02-08 DIAGNOSIS — K50.10 CROHN'S DISEASE OF LARGE INTESTINE WITHOUT COMPLICATION: Primary | ICD-10-CM

## 2023-02-08 NOTE — PROGRESS NOTES
Pt coming for drug level and infusion labs on 3/1/23 the day before her scheduled 3rd every 8 week infusion at [facility] on 3/2/23.

## 2023-03-01 ENCOUNTER — LAB VISIT (OUTPATIENT)
Dept: LAB | Facility: HOSPITAL | Age: 23
End: 2023-03-01
Attending: NURSE PRACTITIONER
Payer: MEDICAID

## 2023-03-01 DIAGNOSIS — K50.10 CROHN'S DISEASE OF LARGE INTESTINE WITHOUT COMPLICATION: ICD-10-CM

## 2023-03-01 PROCEDURE — 36415 COLL VENOUS BLD VENIPUNCTURE: CPT | Performed by: NURSE PRACTITIONER

## 2023-03-01 PROCEDURE — 80230 DRUG ASSAY INFLIXIMAB: CPT | Performed by: NURSE PRACTITIONER

## 2023-03-05 LAB
INFLIXIMAB DRUG LEVEL: 8.8 MCG/ML
INFLIXIMAB INTERPRETATION: NORMAL

## 2023-03-22 ENCOUNTER — TELEPHONE (OUTPATIENT)
Dept: GASTROENTEROLOGY | Facility: CLINIC | Age: 23
End: 2023-03-22
Payer: MEDICAID

## 2023-03-22 NOTE — TELEPHONE ENCOUNTER
----- Message from Nesha Clark sent at 3/22/2023 10:40 AM CDT -----  Contact: pt  Pt is calling in regard to having paperwork signed for her surgery.  Please call her back at [phone] thanks/mpd

## 2023-03-22 NOTE — TELEPHONE ENCOUNTER
Patient states she is having surgery on 4/12. Her last infusion was 3/9 and next infusion is 5/4. She is asking if anything needs to be moved? I informed her typically we do not want infusions within 2 weeks of surgery but hers are not so she should be fine. I will verify with Rita Oscar NP and let her know. She verbalized understanding.

## 2023-03-22 NOTE — TELEPHONE ENCOUNTER
Pt requesting a surgical clearance form completed for breast augmentation surgery scheduled on 04/12/23

## 2023-03-22 NOTE — TELEPHONE ENCOUNTER
----- Message from Sonia Cortes RN sent at 3/22/2023 10:18 AM CDT -----  Contact: Barbara    ----- Message -----  From: Morena Bender  Sent: 3/22/2023   7:44 AM CDT  To: Celestina Salinas Staff    Patient is calling to speak with a nurse regarding surgery. Patient reports scheduled for a breast reduction surgery on 4/12/23 and request to be informed when infusion should be, in regards to surgery. Please give patient a call back at [phone] to assist.     Thank you,  GH

## 2023-03-30 ENCOUNTER — TELEPHONE (OUTPATIENT)
Dept: GASTROENTEROLOGY | Facility: CLINIC | Age: 23
End: 2023-03-30
Payer: MEDICAID

## 2023-03-30 NOTE — TELEPHONE ENCOUNTER
----- Message from Cosmo De La Paz sent at 3/30/2023  9:50 AM CDT -----  Contact: [id number]  Calling regarding pt medication/surgery . Please call back at [phone] or [phone]. Thanksdj

## 2023-03-30 NOTE — TELEPHONE ENCOUNTER
Guerrero from Dr. Ma office asked about patient's infusions with the timing of her surgery on 4/12. I informed her patient should not have infusions within 2 weeks of her infusions. She verbalized understanding. She states they typically give patients Toradol at discharge but patient is not able to have NSAIDs. I agreed and stated patient can have Tylenol. She asked if Gabapentin and Percocet was okay. I informed her that we do not typically like them to take narcotics but that should be fine for the small timeframe she is taking it. She verbalized understanding.

## 2023-05-24 ENCOUNTER — TELEPHONE (OUTPATIENT)
Dept: GASTROENTEROLOGY | Facility: CLINIC | Age: 23
End: 2023-05-24
Payer: MEDICAID

## 2023-05-24 ENCOUNTER — PATIENT MESSAGE (OUTPATIENT)
Dept: GASTROENTEROLOGY | Facility: CLINIC | Age: 23
End: 2023-05-24
Payer: MEDICAID

## 2023-05-24 NOTE — TELEPHONE ENCOUNTER
Infusion orders sent to [facility] and orders scanned into chart. Messaged patient that [facility] would be reaching out .     Jaleesa Cancino, PharmD , [facility]  Clinical Pharmacist Gastroenterology  [facility] [place]

## 2023-05-24 NOTE — TELEPHONE ENCOUNTER
Received a call from [facility] infusion regarding clarification of this patients Remicade orders. Patient was at [facility]. [facility] has closed , so [facility] transferred the [facility] to [facility]. I have returned the call to [facility] 3 times at the number provided [phone] and it states person is unavailable. I prepared external infusion orders for [facility] for this patient due to ease of communication with this infusion center.    Jaleesa Cancino, PharmD , [facility]  Clinical Pharmacist Gastroenterology  [facility] [place]

## 2023-06-01 ENCOUNTER — TELEPHONE (OUTPATIENT)
Dept: GASTROENTEROLOGY | Facility: CLINIC | Age: 23
End: 2023-06-01
Payer: MEDICAID

## 2023-06-01 NOTE — TELEPHONE ENCOUNTER
Faxed completed and signed Remicade infusion orders to [facility] and attached a copy to chart .    Jaleesa Cancino, PharmD , AAHIVP  Clinical Pharmacist Gastroenterology  [facility] [place]

## 2023-06-05 ENCOUNTER — TELEPHONE (OUTPATIENT)
Dept: PHARMACY | Facility: HOSPITAL | Age: 23
End: 2023-06-05
Payer: MEDICAID

## 2023-06-05 NOTE — TELEPHONE ENCOUNTER
[facility] faxed us stating the PA for Remicade ahd been initiated . Awaiting determination.     Jaleesa Cancino, PharmD , AAHIVP  Clinical Pharmacist Gastroenterology  [facility] [place]

## 2023-06-08 ENCOUNTER — TELEPHONE (OUTPATIENT)
Dept: GASTROENTEROLOGY | Facility: CLINIC | Age: 23
End: 2023-06-08
Payer: MEDICAID

## 2023-06-08 NOTE — TELEPHONE ENCOUNTER
Flower from [employer] called asking for clinical documentation (telephone encounter, office note, etc.) stating patient is doing well on Entyvio. Faxed office note from 9/2022 to [phone].    [facility] called later stating Entyvio has been approved for another year.

## 2023-07-07 ENCOUNTER — TELEPHONE (OUTPATIENT)
Dept: GASTROENTEROLOGY | Facility: CLINIC | Age: 23
End: 2023-07-07
Payer: MEDICAID

## 2023-07-07 NOTE — TELEPHONE ENCOUNTER
Spoke to [facility]. They state patient has not been scheduled for the Remicade infusion yet. They forwarded me to Kate Reeves, , to see the status of the PA. No answer, left a voicemail for a call back.

## 2023-07-11 ENCOUNTER — PATIENT MESSAGE (OUTPATIENT)
Dept: GASTROENTEROLOGY | Facility: CLINIC | Age: 23
End: 2023-07-11
Payer: MEDICAID

## 2023-07-27 ENCOUNTER — PATIENT MESSAGE (OUTPATIENT)
Dept: ADMINISTRATIVE | Facility: OTHER | Age: 23
End: 2023-07-27
Payer: MEDICAID

## 2023-08-02 ENCOUNTER — PATIENT MESSAGE (OUTPATIENT)
Dept: GASTROENTEROLOGY | Facility: CLINIC | Age: 23
End: 2023-08-02
Payer: MEDICAID

## 2023-08-03 ENCOUNTER — LAB VISIT (OUTPATIENT)
Dept: LAB | Facility: HOSPITAL | Age: 23
End: 2023-08-03
Attending: NURSE PRACTITIONER
Payer: MEDICAID

## 2023-08-03 ENCOUNTER — TELEPHONE (OUTPATIENT)
Dept: DERMATOLOGY | Facility: CLINIC | Age: 23
End: 2023-08-03
Payer: MEDICAID

## 2023-08-03 ENCOUNTER — OFFICE VISIT (OUTPATIENT)
Dept: GASTROENTEROLOGY | Facility: CLINIC | Age: 23
End: 2023-08-03
Payer: MEDICAID

## 2023-08-03 VITALS
HEIGHT: 64 IN | DIASTOLIC BLOOD PRESSURE: 86 MMHG | SYSTOLIC BLOOD PRESSURE: 118 MMHG | WEIGHT: 175.69 LBS | BODY MASS INDEX: 29.99 KG/M2

## 2023-08-03 DIAGNOSIS — L73.2 HIDRADENITIS: ICD-10-CM

## 2023-08-03 DIAGNOSIS — Z3A.23 23 WEEKS GESTATION OF PREGNANCY: ICD-10-CM

## 2023-08-03 DIAGNOSIS — M35.2 BEHCET'S DISEASE: ICD-10-CM

## 2023-08-03 DIAGNOSIS — K50.10 CROHN'S DISEASE OF LARGE INTESTINE WITHOUT COMPLICATION: ICD-10-CM

## 2023-08-03 DIAGNOSIS — K50.10 CROHN'S DISEASE OF LARGE INTESTINE WITHOUT COMPLICATION: Primary | ICD-10-CM

## 2023-08-03 LAB
25(OH)D3+25(OH)D2 SERPL-MCNC: 13 NG/ML (ref 30–96)
ALBUMIN SERPL BCP-MCNC: 2.9 G/DL (ref 3.5–5.2)
ALP SERPL-CCNC: 69 U/L (ref 55–135)
ALT SERPL W/O P-5'-P-CCNC: 7 U/L (ref 10–44)
ANION GAP SERPL CALC-SCNC: 11 MMOL/L (ref 8–16)
AST SERPL-CCNC: 10 U/L (ref 10–40)
BASOPHILS # BLD AUTO: 0.01 K/UL (ref 0–0.2)
BASOPHILS NFR BLD: 0.1 % (ref 0–1.9)
BILIRUB SERPL-MCNC: 0.5 MG/DL (ref 0.1–1)
BUN SERPL-MCNC: 10 MG/DL (ref 6–20)
CALCIUM SERPL-MCNC: 9.2 MG/DL (ref 8.7–10.5)
CHLORIDE SERPL-SCNC: 105 MMOL/L (ref 95–110)
CO2 SERPL-SCNC: 18 MMOL/L (ref 23–29)
CREAT SERPL-MCNC: 0.6 MG/DL (ref 0.5–1.4)
CRP SERPL-MCNC: 17.1 MG/L (ref 0–8.2)
DIFFERENTIAL METHOD: ABNORMAL
EOSINOPHIL # BLD AUTO: 0 K/UL (ref 0–0.5)
EOSINOPHIL NFR BLD: 0.3 % (ref 0–8)
ERYTHROCYTE [DISTWIDTH] IN BLOOD BY AUTOMATED COUNT: 16.1 % (ref 11.5–14.5)
ERYTHROCYTE [SEDIMENTATION RATE] IN BLOOD BY PHOTOMETRIC METHOD: 52 MM/HR (ref 0–36)
EST. GFR  (NO RACE VARIABLE): >60 ML/MIN/1.73 M^2
FOLATE SERPL-MCNC: 8.5 NG/ML (ref 4–24)
GLUCOSE SERPL-MCNC: 79 MG/DL (ref 70–110)
HBV CORE AB SERPL QL IA: NORMAL
HBV SURFACE AB SER-ACNC: 118.54 MIU/ML
HBV SURFACE AB SER-ACNC: REACTIVE M[IU]/ML
HBV SURFACE AG SERPL QL IA: NORMAL
HCT VFR BLD AUTO: 33 % (ref 37–48.5)
HGB BLD-MCNC: 10.5 G/DL (ref 12–16)
IMM GRANULOCYTES # BLD AUTO: 0.03 K/UL (ref 0–0.04)
IMM GRANULOCYTES NFR BLD AUTO: 0.3 % (ref 0–0.5)
LYMPHOCYTES # BLD AUTO: 1.2 K/UL (ref 1–4.8)
LYMPHOCYTES NFR BLD: 13 % (ref 18–48)
MCH RBC QN AUTO: 27.5 PG (ref 27–31)
MCHC RBC AUTO-ENTMCNC: 31.8 G/DL (ref 32–36)
MCV RBC AUTO: 86 FL (ref 82–98)
MONOCYTES # BLD AUTO: 0.6 K/UL (ref 0.3–1)
MONOCYTES NFR BLD: 6.7 % (ref 4–15)
NEUTROPHILS # BLD AUTO: 7.1 K/UL (ref 1.8–7.7)
NEUTROPHILS NFR BLD: 79.6 % (ref 38–73)
NRBC BLD-RTO: 0 /100 WBC
PLATELET # BLD AUTO: 253 K/UL (ref 150–450)
PMV BLD AUTO: 11.1 FL (ref 9.2–12.9)
POTASSIUM SERPL-SCNC: 3.6 MMOL/L (ref 3.5–5.1)
PROT SERPL-MCNC: 7.1 G/DL (ref 6–8.4)
RBC # BLD AUTO: 3.82 M/UL (ref 4–5.4)
SODIUM SERPL-SCNC: 134 MMOL/L (ref 136–145)
VIT B12 SERPL-MCNC: 249 PG/ML (ref 210–950)
WBC # BLD AUTO: 8.86 K/UL (ref 3.9–12.7)

## 2023-08-03 PROCEDURE — 3079F PR MOST RECENT DIASTOLIC BLOOD PRESSURE 80-89 MM HG: ICD-10-PCS | Mod: CPTII,,, | Performed by: NURSE PRACTITIONER

## 2023-08-03 PROCEDURE — 85652 RBC SED RATE AUTOMATED: CPT | Performed by: NURSE PRACTITIONER

## 2023-08-03 PROCEDURE — 99999 PR PBB SHADOW E&M-EST. PATIENT-LVL III: CPT | Mod: PBBFAC,,, | Performed by: NURSE PRACTITIONER

## 2023-08-03 PROCEDURE — 1160F RVW MEDS BY RX/DR IN RCRD: CPT | Mod: CPTII,,, | Performed by: NURSE PRACTITIONER

## 2023-08-03 PROCEDURE — 82306 VITAMIN D 25 HYDROXY: CPT | Performed by: NURSE PRACTITIONER

## 2023-08-03 PROCEDURE — 80053 COMPREHEN METABOLIC PANEL: CPT | Performed by: NURSE PRACTITIONER

## 2023-08-03 PROCEDURE — 86480 TB TEST CELL IMMUN MEASURE: CPT | Performed by: NURSE PRACTITIONER

## 2023-08-03 PROCEDURE — 3079F DIAST BP 80-89 MM HG: CPT | Mod: CPTII,,, | Performed by: NURSE PRACTITIONER

## 2023-08-03 PROCEDURE — 36415 COLL VENOUS BLD VENIPUNCTURE: CPT | Performed by: NURSE PRACTITIONER

## 2023-08-03 PROCEDURE — 86140 C-REACTIVE PROTEIN: CPT | Performed by: NURSE PRACTITIONER

## 2023-08-03 PROCEDURE — 3074F PR MOST RECENT SYSTOLIC BLOOD PRESSURE < 130 MM HG: ICD-10-PCS | Mod: CPTII,,, | Performed by: NURSE PRACTITIONER

## 2023-08-03 PROCEDURE — 3008F BODY MASS INDEX DOCD: CPT | Mod: CPTII,,, | Performed by: NURSE PRACTITIONER

## 2023-08-03 PROCEDURE — 87340 HEPATITIS B SURFACE AG IA: CPT | Performed by: NURSE PRACTITIONER

## 2023-08-03 PROCEDURE — 1160F PR REVIEW ALL MEDS BY PRESCRIBER/CLIN PHARMACIST DOCUMENTED: ICD-10-PCS | Mod: CPTII,,, | Performed by: NURSE PRACTITIONER

## 2023-08-03 PROCEDURE — 3008F PR BODY MASS INDEX (BMI) DOCUMENTED: ICD-10-PCS | Mod: CPTII,,, | Performed by: NURSE PRACTITIONER

## 2023-08-03 PROCEDURE — 99214 OFFICE O/P EST MOD 30 MIN: CPT | Mod: S$PBB,,, | Performed by: NURSE PRACTITIONER

## 2023-08-03 PROCEDURE — 99214 PR OFFICE/OUTPT VISIT, EST, LEVL IV, 30-39 MIN: ICD-10-PCS | Mod: S$PBB,,, | Performed by: NURSE PRACTITIONER

## 2023-08-03 PROCEDURE — 85025 COMPLETE CBC W/AUTO DIFF WBC: CPT | Performed by: NURSE PRACTITIONER

## 2023-08-03 PROCEDURE — 82607 VITAMIN B-12: CPT | Performed by: NURSE PRACTITIONER

## 2023-08-03 PROCEDURE — 99213 OFFICE O/P EST LOW 20 MIN: CPT | Mod: PBBFAC | Performed by: NURSE PRACTITIONER

## 2023-08-03 PROCEDURE — 99999 PR PBB SHADOW E&M-EST. PATIENT-LVL III: ICD-10-PCS | Mod: PBBFAC,,, | Performed by: NURSE PRACTITIONER

## 2023-08-03 PROCEDURE — 82746 ASSAY OF FOLIC ACID SERUM: CPT | Performed by: NURSE PRACTITIONER

## 2023-08-03 PROCEDURE — 1159F MED LIST DOCD IN RCRD: CPT | Mod: CPTII,,, | Performed by: NURSE PRACTITIONER

## 2023-08-03 PROCEDURE — 86704 HEP B CORE ANTIBODY TOTAL: CPT | Performed by: NURSE PRACTITIONER

## 2023-08-03 PROCEDURE — 86706 HEP B SURFACE ANTIBODY: CPT | Mod: 91 | Performed by: NURSE PRACTITIONER

## 2023-08-03 PROCEDURE — 1159F PR MEDICATION LIST DOCUMENTED IN MEDICAL RECORD: ICD-10-PCS | Mod: CPTII,,, | Performed by: NURSE PRACTITIONER

## 2023-08-03 PROCEDURE — 3074F SYST BP LT 130 MM HG: CPT | Mod: CPTII,,, | Performed by: NURSE PRACTITIONER

## 2023-08-03 NOTE — TELEPHONE ENCOUNTER
----- Message from Bailey Snider sent at 8/3/2023  4:40 PM CDT -----  Regarding: Referral      What is the request in detail: Please call pt to schedule Derm referral.Please advise.    Can the clinic reply by [facility]? No    Best Call Back Number: [phone]       Additional Information:

## 2023-08-03 NOTE — PROGRESS NOTES
Clinic Follow Up:  [facility] Follow Up Note    Reason for Follow Up:  The primary encounter diagnosis was Crohn's disease of large intestine without complication. Diagnoses of Behcet's disease, Hidradenitis, and 23 weeks gestation of pregnancy were also pertinent to this visit.    PCP: Georgina Shah       HPI:  This is a 23 y.o. female here for follow up.   Patient with Crohn's disease and Bechets. Has been in remission on Remicade. She stopped getting Remicade with last done on 3/9/23. She was getting her infusions at [facility] but it closed down. We attempted to get her infusions transferred over to [facility] but was denied and under appeal.   She is currently 23 weeks pregnant.   She is not having any gut issues at this time but says she has abscesses over each axilla.     She started a new job and is not able to take off any for the next 90 days and works the hours of 9:00- 5:00.     Review of Systems   Constitutional:  Negative for activity change and appetite change.        As per interval history above   Respiratory:  Negative for cough and shortness of breath.    Cardiovascular:  Negative for chest pain.   Gastrointestinal:  Negative for abdominal pain, blood in stool, diarrhea, nausea, rectal pain and vomiting.   Skin:  Negative for color change and rash.        abscess       Medical History:  Past Medical History:   Diagnosis Date    Behcet recurrent disease     Crohn's colitis     PIH (pregnancy induced hypertension)     Preeclampsia     G1       Surgical History:   Past Surgical History:   Procedure Laterality Date    COLON BIOPSY      COLONOSCOPY N/A 07/07/2020    Procedure: COLONOSCOPY;  Surgeon: Addy Gupta MD;  Location: [facility];  Service: Pediatrics;  Laterality: N/A;    ESOPHAGOGASTRODUODENOSCOPY N/A 07/07/2020    Procedure: EGD (ESOPHAGOGASTRODUODENOSCOPY);  Surgeon: Addy Gupta MD;  Location: [facility];  Service: Pediatrics;  Laterality: N/A;  "      Family History:   Family History   Problem Relation Age of Onset    No Known Problems Father     Diabetes Mother        Social History:   Social History     Tobacco Use    Smoking status: Never    Smokeless tobacco: Never   Substance Use Topics    Alcohol use: Not Currently    Drug use: No       Allergies:   Review of patient's allergies indicates:   Allergen Reactions    Sulfamethoxazole-trimethoprim Swelling    Penicillins     Triamterene-hydrochlorothiazid      Other reaction(s): Not Indicated    Trimethoprim Hives    Amoxicillin Rash    Augmentin [amoxicillin-pot clavulanate] Rash    Clavulanic acid Rash       Home Medications:  Current Outpatient Medications on File Prior to Visit   Medication Sig Dispense Refill    inFLIXimab (REMICADE) 100 mg injection .COMPLEX      prenatal vit no.124/iron/folic (PRENATAL VITAMIN ORAL) Take by mouth.      prenatal vit-iron fum-folic ac (PRENATAL VITAMIN WITH MINERALS) 28 mg iron- 800 mcg Tab Take 1 tablet by mouth once daily. 30 tablet 11     No current facility-administered medications on file prior to visit.       /86 (BP Location: Left arm, Patient Position: Sitting, BP Method: Small (Manual))   Ht 5' 4" (1.626 m)   Wt 79.7 kg (175 lb 11.3 oz)   LMP 02/12/2023 (Exact Date)   BMI 30.16 kg/m²   Body mass index is 30.16 kg/m².  Physical Exam  Constitutional:       General: She is not in acute distress.  HENT:      Head: Normocephalic.   Musculoskeletal:      Comments: Hidradenitis to bilateral axilla    Neurological:      General: No focal deficit present.      Mental Status: She is alert.   Psychiatric:         Mood and Affect: Mood normal.         Judgment: Judgment normal.     Labs: Pertinent labs reviewed.    Assessment:   1. Crohn's disease of large intestine without complication    2. Behcet's disease    3. Hidradenitis    4. 23 weeks gestation of pregnancy    Needs to restart biologic. She does not feel she will be able to do remicade infusions given " her work schedule. New diagnosis of HS over each axilla. Because of this, we should treat with anti-TNF.     Recommendations:   - referral to [facility] dermat[facility]  - will discuss with IBD team but will likely use Humira.     Crohn's disease of large intestine without complication  -     Cancel: Ambulatory referral/consult to Dermatology; Future; Expected date: 08/10/2023  -     Ambulatory referral/consult to Dermatology; Future; Expected date: 08/10/2023  -     CBC Auto Differential; Future; Expected date: 08/03/2023  -     Comprehensive Metabolic Panel; Future; Expected date: 08/03/2023  -     C-Reactive Protein; Future; Expected date: 08/03/2023  -     Sedimentation rate; Future; Expected date: 08/03/2023  -     Vitamin D; Future; Expected date: 08/03/2023  -     Vitamin B12; Future; Expected date: 08/03/2023  -     Folate; Future; Expected date: 08/03/2023  -     Quantiferon Gold TB; Future; Expected date: 08/03/2023  -     Hepatitis B Surface Ab, Qualitative; Future; Expected date: 08/03/2023  -     Hepatitis B Surface Antigen; Future; Expected date: 08/03/2023  -     Hepatitis B Core Antibody, Total; Future; Expected date: 08/03/2023    Behcet's disease    Hidradenitis  -     Cancel: Ambulatory referral/consult to Dermatology; Future; Expected date: 08/10/2023  -     Ambulatory referral/consult to Dermatology; Future; Expected date: 08/10/2023    23 weeks gestation of pregnancy    Return to Clinic:  No follow-ups on file.    Thank you for the opportunity to participate in the care of this patient.  SAMANTHA Medina

## 2023-08-04 DIAGNOSIS — E55.9 VITAMIN D DEFICIENCY: Primary | ICD-10-CM

## 2023-08-04 RX ORDER — ASPIRIN 325 MG
50000 TABLET, DELAYED RELEASE (ENTERIC COATED) ORAL WEEKLY
Qty: 12 CAPSULE | Refills: 3 | Status: SHIPPED | OUTPATIENT
Start: 2023-08-04 | End: 2023-08-23 | Stop reason: SDUPTHER

## 2023-08-05 LAB
GAMMA INTERFERON BACKGROUND BLD IA-ACNC: 0.06 IU/ML
M TB IFN-G CD4+ BCKGRND COR BLD-ACNC: -0.01 IU/ML
M TB IFN-G CD4+ BCKGRND COR BLD-ACNC: -0.02 IU/ML
MITOGEN IGNF BCKGRD COR BLD-ACNC: 1 IU/ML
TB GOLD PLUS: NEGATIVE

## 2023-08-07 ENCOUNTER — TREATMENT PLANNING (OUTPATIENT)
Dept: GASTROENTEROLOGY | Facility: CLINIC | Age: 23
End: 2023-08-07
Payer: MEDICAID

## 2023-08-07 DIAGNOSIS — K50.10 CROHN'S DISEASE OF LARGE INTESTINE WITHOUT COMPLICATION: Primary | ICD-10-CM

## 2023-08-08 ENCOUNTER — PATIENT MESSAGE (OUTPATIENT)
Dept: GASTROENTEROLOGY | Facility: CLINIC | Age: 23
End: 2023-08-08
Payer: MEDICAID

## 2023-08-08 NOTE — PROGRESS NOTES
IBD Conference / Treatment Planning    Patient's case discussed by: ABHISHEK Shearer, CHAYOP, Jaleesa Cancino, PharmD, and Sonia Cortes, RN, BSN    Dx: Crohn's Disease    Reason to Review: Patient 24 weeks pregnant. Last infusion was in March 2023. Patient is experiencing abscesses on bilateral axillaries - concerns for HS.    Plan: Switch to Humira  Repeat calprotectin  Request patient to send pictures through the portal  [facility] in [facility]

## 2023-08-08 NOTE — PROGRESS NOTES
Addendum:   Needs to see dermatology ASAP. Have reached out with HS clinic who recommends local dermatology with [facility] for evaluation. I have reached out to our Dermatology clinic and are trying to get the patient seen soon. Will plant to start Humira now. Encouraged her to upload pictures to her portal for documentation in the chart.

## 2023-08-14 RX ORDER — ADALIMUMAB 80MG/0.8ML
KIT SUBCUTANEOUS
Qty: 3 PEN | Refills: 0 | Status: ACTIVE | OUTPATIENT
Start: 2023-08-14 | End: 2023-09-13 | Stop reason: DRUGHIGH

## 2023-08-14 RX ORDER — ADALIMUMAB 40MG/0.4ML
40 KIT SUBCUTANEOUS
Qty: 2 PEN | Refills: 2 | Status: ACTIVE | OUTPATIENT
Start: 2023-08-14 | End: 2023-11-06

## 2023-08-15 ENCOUNTER — TELEPHONE (OUTPATIENT)
Dept: PHARMACY | Facility: CLINIC | Age: 23
End: 2023-08-15
Payer: MEDICAID

## 2023-08-15 NOTE — TELEPHONE ENCOUNTER
Hello, this is David Felix, clinical pharmacist with [facility] that is part of your care team.  We have begun working on your prescription that your doctor has sent us. Our next steps include:     Working with your insurance company to obtain approval for your medication  Working with you to ensure your medication is affordable     We will be calling you along the way with updates on your medication but if you have any concerns or receive information that you would like to discuss please reach us at [phone].    Welcome call outcome: Patient/caregiver reached

## 2023-08-18 ENCOUNTER — SPECIALTY PHARMACY (OUTPATIENT)
Dept: PHARMACY | Facility: CLINIC | Age: 23
End: 2023-08-18
Payer: MEDICAID

## 2023-08-23 DIAGNOSIS — E55.9 VITAMIN D DEFICIENCY: ICD-10-CM

## 2023-08-23 RX ORDER — ASPIRIN 325 MG
50000 TABLET, DELAYED RELEASE (ENTERIC COATED) ORAL WEEKLY
Qty: 12 CAPSULE | Refills: 3 | Status: SHIPPED | OUTPATIENT
Start: 2023-08-23 | End: 2023-09-27 | Stop reason: SDUPTHER

## 2023-08-31 ENCOUNTER — PATIENT MESSAGE (OUTPATIENT)
Dept: GASTROENTEROLOGY | Facility: CLINIC | Age: 23
End: 2023-08-31
Payer: MEDICAID

## 2023-09-27 ENCOUNTER — OFFICE VISIT (OUTPATIENT)
Dept: GASTROENTEROLOGY | Facility: CLINIC | Age: 23
End: 2023-09-27
Payer: MEDICAID

## 2023-09-27 VITALS
WEIGHT: 177.69 LBS | BODY MASS INDEX: 30.34 KG/M2 | HEART RATE: 104 BPM | SYSTOLIC BLOOD PRESSURE: 106 MMHG | DIASTOLIC BLOOD PRESSURE: 72 MMHG | HEIGHT: 64 IN

## 2023-09-27 DIAGNOSIS — K50.10 CROHN'S DISEASE OF COLON WITHOUT COMPLICATION: Primary | ICD-10-CM

## 2023-09-27 DIAGNOSIS — E55.9 VITAMIN D DEFICIENCY: ICD-10-CM

## 2023-09-27 DIAGNOSIS — D84.9 IMMUNOCOMPROMISED STATE: ICD-10-CM

## 2023-09-27 PROCEDURE — 3078F PR MOST RECENT DIASTOLIC BLOOD PRESSURE < 80 MM HG: ICD-10-PCS | Mod: CPTII,,, | Performed by: NURSE PRACTITIONER

## 2023-09-27 PROCEDURE — 3074F PR MOST RECENT SYSTOLIC BLOOD PRESSURE < 130 MM HG: ICD-10-PCS | Mod: CPTII,,, | Performed by: NURSE PRACTITIONER

## 2023-09-27 PROCEDURE — 99999 PR PBB SHADOW E&M-EST. PATIENT-LVL III: CPT | Mod: PBBFAC,,, | Performed by: NURSE PRACTITIONER

## 2023-09-27 PROCEDURE — 3008F BODY MASS INDEX DOCD: CPT | Mod: CPTII,,, | Performed by: NURSE PRACTITIONER

## 2023-09-27 PROCEDURE — 1159F MED LIST DOCD IN RCRD: CPT | Mod: CPTII,,, | Performed by: NURSE PRACTITIONER

## 2023-09-27 PROCEDURE — 99999 PR PBB SHADOW E&M-EST. PATIENT-LVL III: ICD-10-PCS | Mod: PBBFAC,,, | Performed by: NURSE PRACTITIONER

## 2023-09-27 PROCEDURE — 1159F PR MEDICATION LIST DOCUMENTED IN MEDICAL RECORD: ICD-10-PCS | Mod: CPTII,,, | Performed by: NURSE PRACTITIONER

## 2023-09-27 PROCEDURE — 3078F DIAST BP <80 MM HG: CPT | Mod: CPTII,,, | Performed by: NURSE PRACTITIONER

## 2023-09-27 PROCEDURE — 99214 OFFICE O/P EST MOD 30 MIN: CPT | Mod: S$PBB,,, | Performed by: NURSE PRACTITIONER

## 2023-09-27 PROCEDURE — 3008F PR BODY MASS INDEX (BMI) DOCUMENTED: ICD-10-PCS | Mod: CPTII,,, | Performed by: NURSE PRACTITIONER

## 2023-09-27 PROCEDURE — 99214 PR OFFICE/OUTPT VISIT, EST, LEVL IV, 30-39 MIN: ICD-10-PCS | Mod: S$PBB,,, | Performed by: NURSE PRACTITIONER

## 2023-09-27 PROCEDURE — 1160F PR REVIEW ALL MEDS BY PRESCRIBER/CLIN PHARMACIST DOCUMENTED: ICD-10-PCS | Mod: CPTII,,, | Performed by: NURSE PRACTITIONER

## 2023-09-27 PROCEDURE — 3074F SYST BP LT 130 MM HG: CPT | Mod: CPTII,,, | Performed by: NURSE PRACTITIONER

## 2023-09-27 PROCEDURE — 1160F RVW MEDS BY RX/DR IN RCRD: CPT | Mod: CPTII,,, | Performed by: NURSE PRACTITIONER

## 2023-09-27 PROCEDURE — 99213 OFFICE O/P EST LOW 20 MIN: CPT | Mod: PBBFAC | Performed by: NURSE PRACTITIONER

## 2023-09-27 RX ORDER — ACETAMINOPHEN 500 MG
TABLET ORAL
COMMUNITY
Start: 2023-07-06 | End: 2024-02-05

## 2023-09-27 RX ORDER — ASPIRIN 325 MG
50000 TABLET, DELAYED RELEASE (ENTERIC COATED) ORAL WEEKLY
Qty: 12 CAPSULE | Refills: 3 | Status: SHIPPED | OUTPATIENT
Start: 2023-09-27 | End: 2024-02-05

## 2023-09-27 NOTE — PROGRESS NOTES
Clinic Follow Up:  [facility] Follow Up Note    Reason for Follow Up:  The primary encounter diagnosis was Crohn's disease of colon without complication. Diagnoses of Vitamin D deficiency and Immunocompromised state were also pertinent to this visit.    PCP: Georgina Shah       HPI:  This is a 23 y.o. female here for follow up of Crohn's disease.   She is currently pregnant.   She restarted Humira 8/23/23. She was due for her first maintenance dose yesterday but has not received her Humira as of yet. It looks like [facility] has tried to reach her but was unable to do so. She does report some improvement in her HS.     Review of Systems   Constitutional:  Negative for activity change and appetite change.        As per interval history above   Respiratory:  Negative for cough and shortness of breath.    Cardiovascular:  Negative for chest pain.   Gastrointestinal:  Negative for abdominal pain, blood in stool, constipation, diarrhea, nausea and vomiting.   Skin:  Negative for color change and rash.       Medical History:  Past Medical History:   Diagnosis Date    Behcet recurrent disease     Crohn's colitis     PIH (pregnancy induced hypertension)     Preeclampsia     G1       Surgical History:   Past Surgical History:   Procedure Laterality Date    COLON BIOPSY      COLONOSCOPY N/A 07/07/2020    Procedure: COLONOSCOPY;  Surgeon: Addy Gupta MD;  Location: [facility];  Service: Pediatrics;  Laterality: N/A;    ESOPHAGOGASTRODUODENOSCOPY N/A 07/07/2020    Procedure: EGD (ESOPHAGOGASTRODUODENOSCOPY);  Surgeon: Addy Gupta MD;  Location: [facility];  Service: Pediatrics;  Laterality: N/A;       Family History:   Family History   Problem Relation Age of Onset    No Known Problems Father     Diabetes Mother        Social History:   Social History     Tobacco Use    Smoking status: Never    Smokeless tobacco: Never   Substance Use Topics    Alcohol use: Not Currently    Drug use: No       Allergies:  "  Review of patient's allergies indicates:   Allergen Reactions    Sulfamethoxazole-trimethoprim Swelling    Penicillins     Triamterene-hydrochlorothiazid      Other reaction(s): Not Indicated    Trimethoprim Hives    Amoxicillin Rash    Augmentin [amoxicillin-pot clavulanate] Rash    Clavulanic acid Rash       Home Medications:  Current Outpatient Medications on File Prior to Visit   Medication Sig Dispense Refill    acetaminophen (TYLENOL EXTRA STRENGTH) 500 MG tablet Take 2 tablets every 6 hours by oral route as needed for 5 days.      prenatal vit no.124/iron/folic (PRENATAL VITAMIN ORAL) Take by mouth.      prenatal vit-iron fum-folic ac (PRENATAL VITAMIN WITH MINERALS) 28 mg iron- 800 mcg Tab Take 1 tablet by mouth once daily. 30 tablet 11    [DISCONTINUED] cholecalciferol, vitamin D3, 1,250 mcg (50,000 unit) capsule Take 1 capsule (50,000 Units total) by mouth once a week. 12 capsule 3    ferrous sulfate (IRON, FERROUS SULFATE,) 325 mg (65 mg iron) Tab tablet Take 1 tablet (325 mg total) by mouth daily with breakfast. (Patient not taking: Reported on 9/27/2023) 30 tablet 4    nystatin-triamcinolone (MYCOLOG) ointment Apply topically 2 (two) times daily. Apply externally for inching/irratation for 7 days 30 g 2     No current facility-administered medications on file prior to visit.       /72 (BP Location: Left arm, Patient Position: Sitting, BP Method: Medium (Manual))   Pulse 104   Ht 5' 4" (1.626 m)   Wt 80.6 kg (177 lb 11.1 oz)   LMP 02/12/2023 (Exact Date)   BMI 30.50 kg/m²   Body mass index is 30.5 kg/m².  Physical Exam  Constitutional:       General: She is not in acute distress.  HENT:      Head: Normocephalic.   Neurological:      General: No focal deficit present.      Mental Status: She is alert.   Psychiatric:         Mood and Affect: Mood normal.         Judgment: Judgment normal.         Labs: Pertinent labs reviewed.    Assessment:   1. Crohn's disease of colon without complication  "   2. Vitamin D deficiency    3. Immunocompromised state        Recommendations:   She will contact [facility] today to schedule shipment of Humira  Continue Humira  Needs vitamin d resent     Crohn's disease of colon without complication    Vitamin D deficiency  -     cholecalciferol, vitamin D3, 1,250 mcg (50,000 unit) capsule; Take 1 capsule (50,000 Units total) by mouth once a week.  Dispense: 12 capsule; Refill: 3    Immunocompromised state    Return to Clinic:  3 months     Thank you for the opportunity to participate in the care of this patient.  SAMANTHA Medina

## 2023-09-27 NOTE — Clinical Note
Due for first maintenance yesterday. [facility] having difficult time getting in touch with her for shipping. I told her she must contact them today and gave [facility] number.  She missed her [facility] derm appt a couple of weeks ago. Needs it rescheduled. She says she is off on Fridays.

## 2023-10-26 ENCOUNTER — PATIENT MESSAGE (OUTPATIENT)
Dept: GASTROENTEROLOGY | Facility: CLINIC | Age: 23
End: 2023-10-26
Payer: MEDICAID

## 2023-11-20 ENCOUNTER — PATIENT MESSAGE (OUTPATIENT)
Dept: GASTROENTEROLOGY | Facility: CLINIC | Age: 23
End: 2023-11-20
Payer: MEDICAID

## 2023-12-27 NOTE — PROGRESS NOTES
Clinic Follow Up:  [facility] Follow Up Note    Reason for Follow Up:  The primary encounter diagnosis was Crohn's disease of large intestine without complication. A diagnosis of Immunocompromised state was also pertinent to this visit.    PCP: Georgina Shah       HPI:  This is a 22 y.o. female here for follow up of Crohn's disease.     IBD Histroy  - Type: Crohn's disease  - Diagnosed: 2017  - Disease Location: large bowel only   - Surgeries related to IBD: none  - Extra-intestinal Manifestations: rashes-- face, arms, legs, mouth ulcers      Current Medications  Remicade 5 mg/kg every 5 weeks, due for next infusion 10/13/22     [facility]  [address], [place], [place] [id number] [phone]     Past Medications  Imuran, started 2015 for Behcet's disease--mouth ulcers, stopped in 2017-- patient discontinued medication     Drug and Antibody Levels   5/10/16Thiopurine metabolite-- low 6TG. undetected 6MMP   5/19/20 Infliximab drug level 6.1  10/1/20 Infliximab 1.2 no AB formation   1/14/21 infliximab 16, no AB formation.      Endoscopy Reports  11/21/17-- EGD: H. Pylori gastritis   11/21/17-- colonoscopy pathology report chronic active colitis with granulomas.   7/2020- no active disease.      Interval History  She had a baby boy a couple of months ago. She delivered vaginally without any complications. She and baby are doing well.   She has restarted her Remicade. She just started a new job and would like to change medications to a SQ injection so she does not have to take off of work. She has been doing great on Remicade and has been in deep remission since 2020. She does report some constipation issues lately. No abdominal pan or hematochezia.      Preventative Medicine     Immunizations  - Influenza: 1/20/21  - Pnemococcal: PCV-13: none; PSV-23 due   - Hepatitis A/B: sAB grayzone.   - COVID 19- 6/27/22, 6/6/22  - Varicella: 8/12/10, 8/20/03     Cancer Prevention   - Date of  last pap smear: 24, recommend yearly   - Date of last skin cancer screening: none, recommend yearly  - Date of last surveillance colonoscopy: 2020, due  for dysplasia screening.      Bone Health  - Vitamin D level: low  - Date of last DEXA: NA     Therapy Related Testing  - Date of last TB testin21 quant gold negative   - TPMT status: unable to see report      Miscellaneous  - Vitamin B 12 level (if ileal disease or resection): normal    - Smoking status: none   - NSAID use: occasionally   - History of C. Diff: none   - Family planning: BC pills.     Recent Labs:   Lab Results   Component Value Date    WBC 4.83 09/15/2022    HGB 11.1 (L) 09/15/2022    HCT 35.7 (L) 09/15/2022     09/15/2022    ALT 15 09/15/2022    AST 17 09/15/2022    BUN 12 09/15/2022    CREATININE 0.8 09/15/2022    ALBUMIN 3.6 09/15/2022     09/15/2022    K 3.8 09/15/2022     (H) 09/15/2022     Lab Results   Component Value Date    CRP 0.5 12/10/2020     Lab Results   Component Value Date    HEPBSAB Grayzone (A) 2021    HEPBSAG Negative 2022    HEPBCAB Negative 2021     Lab Results   Component Value Date    UCYCXEKR50 464 09/15/2022    FOLATE 15.4 09/15/2022     Lab Results   Component Value Date    RJAUJJEK02KI 14 (L) 09/15/2022     No results found for: TBGOLD     Review of Systems   Constitutional:  Negative for activity change and appetite change.        As per interval history above   Respiratory:  Negative for cough and shortness of breath.    Cardiovascular:  Negative for chest pain.   Gastrointestinal:  Positive for constipation. Negative for abdominal pain, anal bleeding, blood in stool, diarrhea, nausea and vomiting.   Skin:  Negative for color change and rash.     Medical History:  Past Medical History:   Diagnosis Date    Behcet recurrent disease     Crohn's colitis        Surgical History:   Past Surgical History:   Procedure Laterality Date    COLON BIOPSY      COLONOSCOPY N/A  "7/7/2020    Procedure: COLONOSCOPY;  Surgeon: Addy Gupta MD;  Location: [facility];  Service: Pediatrics;  Laterality: N/A;    ESOPHAGOGASTRODUODENOSCOPY N/A 7/7/2020    Procedure: EGD (ESOPHAGOGASTRODUODENOSCOPY);  Surgeon: Addy Gupta MD;  Location: [facility];  Service: Pediatrics;  Laterality: N/A;    kidney biopay         Family History:   Family History   Problem Relation Age of Onset    No Known Problems Father     Diabetes Mother        Social History:   Social History     Tobacco Use    Smoking status: Never    Smokeless tobacco: Never   Substance Use Topics    Alcohol use: No    Drug use: No       Allergies:   Review of patient's allergies indicates:   Allergen Reactions    Sulfamethoxazole-trimethoprim Swelling    Sulfa (sulfonamide antibiotics)     Trimethoprim Hives    Amoxicillin Rash    Augmentin [amoxicillin-pot clavulanate] Rash    Clavulanic acid Rash       Home Medications:  Current Outpatient Medications on File Prior to Visit   Medication Sig Dispense Refill    inFLIXimab (REMICADE) 100 mg injection .COMPLEX      norgestimate-ethinyl estradioL (ORTHO TRI-CYCLEN LO) 0.18/0.215/0.25 mg-25 mcg tablet Tri-Lo-Estarylla 0.18 mg/0.215 mg/0.25 mg-25 mcg tablet   TAKE 1 TABLET BY MOUTH ONCE DAILY      prenatal vit-iron fum-folic ac (PRENATAL VITAMIN WITH MINERALS) 28 mg iron- 800 mcg Tab Take 1 tablet by mouth once daily. 30 tablet 11    butalbital-acetaminophen-caff -40 mg Cap       labetaloL (NORMODYNE) 200 MG tablet       triamcinolone acetonide 0.025% (KENALOG) 0.025 % cream Apply topically 2 (two) times daily. (Patient not taking: No sig reported) 30 g 0     No current facility-administered medications on file prior to visit.       /64 (BP Location: Left arm, Patient Position: Sitting, BP Method: Medium (Manual))   Pulse 101   Ht 5' 4" (1.626 m)   Wt 76.5 kg (168 lb 10.4 oz)   LMP 09/10/2022   Breastfeeding No   BMI 28.95 kg/m²   Body mass index is 28.95 kg/m².  Physical " Exam  Constitutional:       General: She is not in acute distress.  HENT:      Head: Normocephalic and atraumatic.   Eyes:      General: No scleral icterus.     Conjunctiva/sclera: Conjunctivae normal.   Cardiovascular:      Rate and Rhythm: Normal rate and regular rhythm.      Heart sounds: No murmur heard.  Pulmonary:      Effort: Pulmonary effort is normal. No respiratory distress.      Breath sounds: Normal breath sounds. No wheezing.   Abdominal:      General: Abdomen is flat. Bowel sounds are normal.      Palpations: Abdomen is soft.      Tenderness: There is no abdominal tenderness.   Skin:     General: Skin is warm and dry.   Neurological:      General: No focal deficit present.      Mental Status: She is alert and oriented to person, place, and time.      Cranial Nerves: No cranial nerve deficit.   Psychiatric:         Mood and Affect: Mood normal.         Judgment: Judgment normal.       Labs: Pertinent labs reviewed.    Assessment:   1. Crohn's disease of large intestine without complication    2. Immunocompromised state      Patient with Crohn's and Behcets in remission on Remicade.  Patient currently receives Remicade every 5 weeks but recently started a new job is requesting different medication so she does not have to take off of work as much.    Recommendations:   -we should be able to adjust Remicade to either 7.5 mg/kg or 10 mg/kg every 8 weeks which would be better than switching biologics   - will discuss with IBD team  - update labs today.     Talk with barbara about spreading patricia out     Crohn's disease of large intestine without complication  -     Vitamin D; Future; Expected date: 09/15/2022  -     Vitamin B12; Future; Expected date: 09/15/2022  -     Folate; Future; Expected date: 09/15/2022  -     CBC Auto Differential; Future; Expected date: 09/15/2022  -     Comprehensive Metabolic Panel; Future; Expected date: 09/15/2022  -     Quantiferon Gold TB; Future; Expected date:  09/15/2022    Immunocompromised state      Return to Clinic:  Follow up in about 1 year (around 9/15/2023).    Thank you for the opportunity to participate in the care of this patient.  SAMANTHA Medina           no

## 2025-02-12 ENCOUNTER — TELEPHONE (OUTPATIENT)
Dept: GASTROENTEROLOGY | Facility: CLINIC | Age: 25
End: 2025-02-12
Payer: MEDICAID

## 2025-02-12 NOTE — TELEPHONE ENCOUNTER
----- Message from TRISHA Scott sent at 2/12/2025  9:13 AM CST -----  Contact: pt    ----- Message -----  From: Nesha Clark  Sent: 2/12/2025   8:20 AM CST  To: [facility] Staff    Type:  Sooner Apoointment Request    Caller is requesting a sooner appointment.  Caller declined first available appointment listed below.  Caller will not accept being placed on the waitlist and is requesting a message be sent to doctor.  Name of Caller: pt  When is the first available appointment? No solutions  Symptoms: meds  Would the patient rather a call back or a response via [facility]? phone  Best Call Back Number: [phone]  Additional Information:

## 2025-02-25 ENCOUNTER — LAB VISIT (OUTPATIENT)
Dept: LAB | Facility: HOSPITAL | Age: 25
End: 2025-02-25
Attending: INTERNAL MEDICINE
Payer: MEDICAID

## 2025-02-25 ENCOUNTER — OFFICE VISIT (OUTPATIENT)
Dept: GASTROENTEROLOGY | Facility: CLINIC | Age: 25
End: 2025-02-25
Payer: MEDICAID

## 2025-02-25 VITALS
HEART RATE: 81 BPM | HEIGHT: 64 IN | BODY MASS INDEX: 29.47 KG/M2 | WEIGHT: 172.63 LBS | DIASTOLIC BLOOD PRESSURE: 79 MMHG | SYSTOLIC BLOOD PRESSURE: 129 MMHG

## 2025-02-25 DIAGNOSIS — K50.10 CROHN'S DISEASE OF COLON WITHOUT COMPLICATION: Primary | ICD-10-CM

## 2025-02-25 DIAGNOSIS — Z86.19 HISTORY OF HELICOBACTER PYLORI INFECTION: ICD-10-CM

## 2025-02-25 DIAGNOSIS — K50.10 CROHN'S DISEASE OF COLON WITHOUT COMPLICATION: ICD-10-CM

## 2025-02-25 DIAGNOSIS — E55.9 VITAMIN D DEFICIENCY: ICD-10-CM

## 2025-02-25 DIAGNOSIS — M35.2 BEHCET'S DISEASE: ICD-10-CM

## 2025-02-25 DIAGNOSIS — R10.10 UPPER ABDOMINAL PAIN: ICD-10-CM

## 2025-02-25 LAB
HBV CORE IGM SERPL QL IA: NORMAL
HBV SURFACE AB SER-ACNC: 93.82 MIU/ML
HBV SURFACE AB SER-ACNC: REACTIVE M[IU]/ML
HBV SURFACE AG SERPL QL IA: NORMAL
HCV AB SERPL QL IA: NORMAL
HIV 1+2 AB+HIV1 P24 AG SERPL QL IA: NORMAL

## 2025-02-25 PROCEDURE — 1159F MED LIST DOCD IN RCRD: CPT | Mod: CPTII,,, | Performed by: INTERNAL MEDICINE

## 2025-02-25 PROCEDURE — 99213 OFFICE O/P EST LOW 20 MIN: CPT | Mod: PBBFAC | Performed by: INTERNAL MEDICINE

## 2025-02-25 PROCEDURE — 3078F DIAST BP <80 MM HG: CPT | Mod: CPTII,,, | Performed by: INTERNAL MEDICINE

## 2025-02-25 PROCEDURE — 99214 OFFICE O/P EST MOD 30 MIN: CPT | Mod: S$PBB,,, | Performed by: INTERNAL MEDICINE

## 2025-02-25 PROCEDURE — 3008F BODY MASS INDEX DOCD: CPT | Mod: CPTII,,, | Performed by: INTERNAL MEDICINE

## 2025-02-25 PROCEDURE — 86705 HEP B CORE ANTIBODY IGM: CPT | Performed by: INTERNAL MEDICINE

## 2025-02-25 PROCEDURE — 86706 HEP B SURFACE ANTIBODY: CPT | Performed by: INTERNAL MEDICINE

## 2025-02-25 PROCEDURE — 36415 COLL VENOUS BLD VENIPUNCTURE: CPT | Performed by: INTERNAL MEDICINE

## 2025-02-25 PROCEDURE — 87340 HEPATITIS B SURFACE AG IA: CPT | Performed by: INTERNAL MEDICINE

## 2025-02-25 PROCEDURE — 86803 HEPATITIS C AB TEST: CPT | Performed by: INTERNAL MEDICINE

## 2025-02-25 PROCEDURE — 99999 PR PBB SHADOW E&M-EST. PATIENT-LVL III: CPT | Mod: PBBFAC,,, | Performed by: INTERNAL MEDICINE

## 2025-02-25 PROCEDURE — 3074F SYST BP LT 130 MM HG: CPT | Mod: CPTII,,, | Performed by: INTERNAL MEDICINE

## 2025-02-25 PROCEDURE — 87389 HIV-1 AG W/HIV-1&-2 AB AG IA: CPT | Performed by: INTERNAL MEDICINE

## 2025-02-25 PROCEDURE — 86480 TB TEST CELL IMMUN MEASURE: CPT | Performed by: INTERNAL MEDICINE

## 2025-02-25 RX ORDER — SODIUM, POTASSIUM,MAG SULFATES 17.5-3.13G
1 SOLUTION, RECONSTITUTED, ORAL ORAL DAILY
Qty: 1 EACH | Refills: 0 | Status: SHIPPED | OUTPATIENT
Start: 2025-02-25 | End: 2025-02-27

## 2025-02-25 RX ORDER — ACETAMINOPHEN 500 MG
500 TABLET ORAL
COMMUNITY

## 2025-02-25 NOTE — PROGRESS NOTES
[facility]  Gastroenterology    PCP: Nehal, Primary Doctor    2/25/25    HPI       Crohn's Disease     Additional comments: Stopped infusions 1-2 years as of now.           Last edited by Cintia Mccoy MA on 2/25/2025  9:41 AM.      Reason for Visit: F/u Crohn's Disease    Subjective:   Barbara Mari is a 24 y.o. female here for follow-up Crohn's disease. This is patient's first visit with me, previously followed by GI NP Rita Oscar. Patient was last seen in 2023. Has history of Crohn's disease, was pregnant at time of last clinic visit. Was initially in remission on Remicade but then later changed to Humira due to issues with getting to infusions-she was supposed to start 08/2023 but patient reports she never even took one Humira injection because she was uncomfortable with the idea of injecting herself. Last colonoscopy in 2020 showed normal colon. She reports she has been off of all medications for about two years now. She is starting to develop nausea, fatigue, also has constipation- no BM for a week or two. Denies blood in stool. She has some upper abdominal discomfort as well. Has history of H pylori in the past that was treated. She has not tried anything for the constipation because she doesn't like to take medications.     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 undetected 6MMP   5/19/20 Infliximab drug level 6.1  10/1/20 Infliximab 1.2 no AB  formation   1/14/21 infliximab 16, no AB formation.      Endoscopy Reports  11/21/17-- EGD: H. Pylori gastritis   11/21/17-- colonoscopy pathology report chronic active colitis with granulomas.   7/2020- no active disease.     Past Medical History:   Diagnosis Date    Behcet recurrent disease     Crohn's colitis     PIH (pregnancy induced hypertension)     Preeclampsia     G1    Vaginal delivery     x2       Past Surgical History:   Procedure Laterality Date    COLON BIOPSY      COLONOSCOPY N/A 07/07/2020    Procedure: COLONOSCOPY;  Surgeon: Addy Gupta MD;  Location: [facility];  Service: Pediatrics;  Laterality: N/A;    ESOPHAGOGASTRODUODENOSCOPY N/A 07/07/2020    Procedure: EGD (ESOPHAGOGASTRODUODENOSCOPY);  Surgeon: Addy Gupta MD;  Location: [facility];  Service: Pediatrics;  Laterality: N/A;    TUBAL LIGATION  02/07/2024    L/S BTL       Medications Ordered Prior to Encounter[1]    Review of patient's allergies indicates:   Allergen Reactions    Sulfamethoxazole-trimethoprim Swelling    Atorvastatin Other (See Comments)     atorvastatin    Penicillins     Triamterene-hydrochlorothiazid      Other reaction(s): Not Indicated    Trimethoprim Hives    Amoxicillin Rash    Augmentin [amoxicillin-pot clavulanate] Rash    Clavulanic acid Rash       Social History[2]    Family History   Problem Relation Name Age of Onset    No Known Problems Father      Diabetes Mother         Review of Systems   Constitutional:  Negative for appetite change, fever and unexpected weight change.   HENT:  Negative for postnasal drip, rhinorrhea, sneezing, sore throat and trouble swallowing.    Eyes:  Negative for visual disturbance.   Respiratory:  Negative for cough, shortness of breath and wheezing.    Cardiovascular:  Negative for chest pain, palpitations and leg swelling.   Gastrointestinal:  Positive for abdominal pain, constipation and nausea. Negative for blood in stool, diarrhea and vomiting.   Genitourinary:  Negative  for dysuria.   Musculoskeletal:  Negative for arthralgias, joint swelling and myalgias.   Skin:  Negative for color change, pallor and rash.   Neurological:  Negative for weakness, light-headedness, numbness and headaches.   Hematological:  Negative for adenopathy. Does not bruise/bleed easily.   Psychiatric/Behavioral:  Negative for agitation.            Objective:   Vitals:   Vitals:    02/25/25 0935   BP: 129/79   Pulse: 81       Physical Exam  Vitals reviewed.   Constitutional:       General: She is not in acute distress.     Appearance: She is not diaphoretic.   HENT:      Head: Normocephalic and atraumatic.      Mouth/Throat:      Pharynx: No oropharyngeal exudate.   Eyes:      General: No scleral icterus.        Right eye: No discharge.         Left eye: No discharge.      Conjunctiva/sclera: Conjunctivae normal.      Pupils: Pupils are equal, round, and reactive to light.   Cardiovascular:      Rate and Rhythm: Normal rate and regular rhythm.   Abdominal:      General: There is distension.      Palpations: Abdomen is soft. There is no mass.      Tenderness: There is no abdominal tenderness. There is no guarding.   Musculoskeletal:         General: Normal range of motion.      Cervical back: Normal range of motion.   Skin:     General: Skin is warm and dry.      Coloration: Skin is not pale.      Findings: No erythema or rash.   Neurological:      Mental Status: She is alert and oriented to person, place, and time.       IMPRESSION     Problem List Items Addressed This Visit          Immunology/Multi System    Behcet's disease       GI    Crohn's disease of colon without complication - Primary    Relevant Orders    HIV 1/2 Ag/Ab (4th Gen)    Quantiferon Gold TB    Hepatitis C Antibody    HEPATITIS B SURFACE ANTIBODY    HEPATITIS B CORE ANTIBODY, IGM    Hepatitis B Surface Antigen    Ambulatory referral/consult to Endo Procedure      Other Visit Diagnoses         Vitamin D deficiency          Upper  abdominal pain        Relevant Orders    Ambulatory referral/consult to Endo Procedure       History of Helicobacter pylori infection                PLANS:    - Patient has been off of biologic medications for two years  - Needs repeat colonoscopy for disease staging. Due to upper abdominal symptoms will schedule EGD along with it. Suprep e-scribed to patient's pharmacy  - Will obtain a repeat pre-biologic work-up  - Patient would like to go back onto infusions. Doesn't want to have to inject herself. May be able to go back on to Remicade  - Will obtain the above work-up and have patient follow back with Rita Oscar to discuss further  - Discussed importance of medication compliance in the future  - Also recommend to get started onto Miralax OTC for the constipation in preparation for upcoming colonoscopy    Crohn's disease of colon without complication  -     HIV 1/2 Ag/Ab (4th Gen); Future; Expected date: 02/25/2025  -     Quantiferon Gold TB; Future; Expected date: 02/25/2025  -     Hepatitis C Antibody; Future; Expected date: 02/25/2025  -     HEPATITIS B SURFACE ANTIBODY; Future; Expected date: 02/25/2025  -     HEPATITIS B CORE ANTIBODY, IGM; Future; Expected date: 02/25/2025  -     Hepatitis B Surface Antigen; Future; Expected date: 02/25/2025  -     Cancel: Ambulatory referral/consult to Endo Procedure ; Future; Expected date: 02/26/2025  -     Ambulatory referral/consult to Endo Procedure ; Future; Expected date: 02/26/2025    Behcet's disease    Vitamin D deficiency    Upper abdominal pain  -     Ambulatory referral/consult to Endo Procedure ; Future; Expected date: 02/26/2025    History of Helicobacter pylori infection    Other orders  -     sodium,potassium,mag sulfates (SUPREP BOWEL PREP KIT) 17.5-3.13-1.6 gram SolR; Take 177 mLs by mouth once daily. for 2 days  Dispense: 1 each; Refill: 0      Tiffany Butt MD  Gastroenterology              [1]   Current  Outpatient Medications on File Prior to Visit   Medication Sig Dispense Refill    acetaminophen (TYLENOL) 500 MG tablet 500 mg.      prenatal vit-iron fum-folic ac (PRENATAL VITAMIN WITH MINERALS) 28 mg iron- 800 mcg Tab Take 1 tablet by mouth once daily. (Patient not taking: Reported on 2/25/2025) 30 tablet 11     No current facility-administered medications on file prior to visit.   [2]   Social History  Socioeconomic History    Marital status: Single   Tobacco Use    Smoking status: Never    Smokeless tobacco: Never   Substance and Sexual Activity    Alcohol use: Yes    Drug use: No    Sexual activity: Yes     Partners: Male     Birth control/protection: See Surgical Hx     Social Drivers of Health     Financial Resource Strain: Low Risk  (7/17/2024)    Received from [facility]    Overall Financial Resource Strain (CARDIA)     Difficulty of Paying Living Expenses: Not hard at all   Food Insecurity: No Food Insecurity (7/17/2024)    Received from [facility]    Hunger Vital Sign     Worried About Running Out of Food in the Last Year: Never true     Ran Out of Food in the Last Year: Never true   Transportation Needs: No Transportation Needs (7/17/2024)    Received from [facility]    PRAPARE - Transportation     Lack of Transportation (Medical): No     Lack of Transportation (Non-Medical): No   Housing Stability: Low Risk  (7/17/2024)    Received from [facility]    Housing Stability Vital Sign     Unable to Pay for Housing in the Last Year: No     Number of Times Moved in the Last Year: 0     Homeless in the Last Year: No

## 2025-02-26 LAB
GAMMA INTERFERON BACKGROUND BLD IA-ACNC: 0 IU/ML
M TB IFN-G CD4+ BCKGRND COR BLD-ACNC: 0.05 IU/ML
M TB IFN-G CD4+ BCKGRND COR BLD-ACNC: 0.05 IU/ML
MITOGEN IGNF BCKGRD COR BLD-ACNC: 10 IU/ML
TB GOLD PLUS: NEGATIVE

## 2025-02-27 ENCOUNTER — HOSPITAL ENCOUNTER (OUTPATIENT)
Dept: PREADMISSION TESTING | Facility: HOSPITAL | Age: 25
Discharge: HOME | End: 2025-02-27
Attending: INTERNAL MEDICINE
Payer: MEDICAID

## 2025-02-27 DIAGNOSIS — K50.10 CROHN'S DISEASE OF COLON WITHOUT COMPLICATION: Primary | ICD-10-CM

## 2025-02-27 DIAGNOSIS — R10.10 UPPER ABDOMINAL PAIN: ICD-10-CM

## 2025-03-04 RX ORDER — ATORVASTATIN CALCIUM 20 MG/1
TABLET, FILM COATED ORAL
COMMUNITY
Start: 2023-12-20

## 2025-03-10 ENCOUNTER — HOSPITAL ENCOUNTER (OUTPATIENT)
Dept: ENDOSCOPY | Facility: HOSPITAL | Age: 25
Discharge: HOME | End: 2025-03-10
Attending: INTERNAL MEDICINE

## 2025-03-20 ENCOUNTER — PATIENT MESSAGE (OUTPATIENT)
Dept: GASTROENTEROLOGY | Facility: CLINIC | Age: 25
End: 2025-03-20
Payer: MEDICAID

## 2025-03-27 ENCOUNTER — TELEPHONE (OUTPATIENT)
Dept: PREADMISSION TESTING | Facility: HOSPITAL | Age: 25
End: 2025-03-27
Payer: MEDICAID

## 2025-03-27 RX ORDER — SODIUM, POTASSIUM,MAG SULFATES 17.5-3.13G
1 SOLUTION, RECONSTITUTED, ORAL ORAL DAILY
Qty: 1 KIT | Refills: 0 | Status: SHIPPED | OUTPATIENT
Start: 2025-03-27 | End: 2025-03-29

## 2025-03-27 NOTE — TELEPHONE ENCOUNTER
----- Message from Nancy sent at 3/27/2025  9:03 AM CDT -----  Regarding: reschedule  Patient needs to reschedule her cancelled Colonoscopy and EGD, please call her back at [phone]- Thanks

## 2025-04-01 ENCOUNTER — ANESTHESIA EVENT (OUTPATIENT)
Dept: ENDOSCOPY | Facility: HOSPITAL | Age: 25
End: 2025-04-01
Payer: MEDICAID

## 2025-04-01 NOTE — ANESTHESIA PREPROCEDURE EVALUATION
04/01/2025  Barbara Mari is a 24 y.o., female.    Past Medical History:   Diagnosis Date    Behcet recurrent disease     Crohn's colitis     PIH (pregnancy induced hypertension)     Preeclampsia     G1    Vaginal delivery     x2     Past Surgical History:   Procedure Laterality Date    COLON BIOPSY      COLONOSCOPY N/A 07/07/2020    Procedure: COLONOSCOPY;  Surgeon: Addy Gupta MD;  Location: [facility];  Service: Pediatrics;  Laterality: N/A;    ESOPHAGOGASTRODUODENOSCOPY N/A 07/07/2020    Procedure: EGD (ESOPHAGOGASTRODUODENOSCOPY);  Surgeon: Addy Gupta MD;  Location: [facility];  Service: Pediatrics;  Laterality: N/A;    TUBAL LIGATION  02/07/2024    L/S BTL       Pre-op Assessment    I have reviewed the Patient Summary Reports.     I have reviewed the Nursing Notes. I have reviewed the NPO Status.   I have reviewed the Medications.     Review of Systems  Anesthesia Hx:  No problems with previous Anesthesia   History of prior surgery of interest to airway management or planning:          Denies Family Hx of Anesthesia complications.    Denies Personal Hx of Anesthesia complications.                    Social:  Non-Smoker, Social Alcohol Use       Cardiovascular:     Hypertension                                    Hypertension         Hepatic/GI:  Bowel Prep.      Crohn's disease                 Physical Exam  General: Well nourished, Cooperative, Alert and Oriented    Airway:  Mouth Opening: Normal  TM Distance: Normal  Tongue: Normal  Neck ROM: Normal ROM    Dental:  Intact        Anesthesia Plan  Type of Anesthesia, risks & benefits discussed:    Anesthesia Type: Gen Natural Airway  Intra-op Monitoring Plan: Standard ASA Monitors  Post Op Pain Control Plan: multimodal analgesia  Induction:  IV  Informed Consent: Informed consent signed with the Patient and all parties understand the risks and  agree with anesthesia plan.  All questions answered. Patient consented to blood products? No  ASA Score: 2  Day of Surgery Review of History & Physical: H&P Update referred to the surgeon/provider.    Ready For Surgery From Anesthesia Perspective.     .

## 2025-04-04 ENCOUNTER — ANESTHESIA (OUTPATIENT)
Dept: ENDOSCOPY | Facility: HOSPITAL | Age: 25
End: 2025-04-04
Payer: MEDICAID

## 2025-04-04 ENCOUNTER — HOSPITAL ENCOUNTER (OUTPATIENT)
Dept: ENDOSCOPY | Facility: HOSPITAL | Age: 25
Discharge: HOME | End: 2025-04-04
Attending: INTERNAL MEDICINE | Admitting: INTERNAL MEDICINE
Payer: MEDICAID

## 2025-04-04 VITALS
HEIGHT: 64 IN | DIASTOLIC BLOOD PRESSURE: 69 MMHG | RESPIRATION RATE: 20 BRPM | BODY MASS INDEX: 28.63 KG/M2 | HEART RATE: 67 BPM | OXYGEN SATURATION: 100 % | TEMPERATURE: 98 F | SYSTOLIC BLOOD PRESSURE: 120 MMHG | WEIGHT: 167.69 LBS

## 2025-04-04 DIAGNOSIS — K29.70 HELICOBACTER PYLORI GASTRITIS: ICD-10-CM

## 2025-04-04 DIAGNOSIS — B96.81 HELICOBACTER PYLORI GASTRITIS: ICD-10-CM

## 2025-04-04 DIAGNOSIS — K50.10 CROHN'S DISEASE OF COLON WITHOUT COMPLICATION: ICD-10-CM

## 2025-04-04 DIAGNOSIS — R10.10 UPPER ABDOMINAL PAIN: Primary | ICD-10-CM

## 2025-04-04 LAB
B-HCG UR QL: NEGATIVE
CTP QC/QA: YES

## 2025-04-04 PROCEDURE — 27201012 HC FORCEPS, HOT/COLD, DISP: Performed by: INTERNAL MEDICINE

## 2025-04-04 PROCEDURE — 88305 TISSUE EXAM BY PATHOLOGIST: CPT | Mod: 26,,, | Performed by: STUDENT IN AN ORGANIZED HEALTH CARE EDUCATION/TRAINING PROGRAM

## 2025-04-04 PROCEDURE — 37000008 HC ANESTHESIA 1ST 15 MINUTES

## 2025-04-04 PROCEDURE — 88305 TISSUE EXAM BY PATHOLOGIST: CPT | Mod: TC | Performed by: INTERNAL MEDICINE

## 2025-04-04 PROCEDURE — 45380 COLONOSCOPY AND BIOPSY: CPT | Performed by: INTERNAL MEDICINE

## 2025-04-04 PROCEDURE — 81025 URINE PREGNANCY TEST: CPT | Performed by: INTERNAL MEDICINE

## 2025-04-04 PROCEDURE — 63600175 PHARM REV CODE 636 W HCPCS: Performed by: NURSE ANESTHETIST, CERTIFIED REGISTERED

## 2025-04-04 PROCEDURE — 45380 COLONOSCOPY AND BIOPSY: CPT | Mod: ,,, | Performed by: INTERNAL MEDICINE

## 2025-04-04 PROCEDURE — 37000009 HC ANESTHESIA EA ADD 15 MINS

## 2025-04-04 RX ORDER — SODIUM CHLORIDE, SODIUM LACTATE, POTASSIUM CHLORIDE, CALCIUM CHLORIDE 600; 310; 30; 20 MG/100ML; MG/100ML; MG/100ML; MG/100ML
INJECTION, SOLUTION INTRAVENOUS CONTINUOUS
Status: DISCONTINUED | OUTPATIENT
Start: 2025-04-04 | End: 2025-04-05 | Stop reason: HOSPADM

## 2025-04-04 RX ORDER — PROPOFOL 10 MG/ML
VIAL (ML) INTRAVENOUS
Status: DISCONTINUED | OUTPATIENT
Start: 2025-04-04 | End: 2025-04-04

## 2025-04-04 RX ORDER — LIDOCAINE HYDROCHLORIDE 20 MG/ML
INJECTION INTRAVENOUS
Status: DISCONTINUED | OUTPATIENT
Start: 2025-04-04 | End: 2025-04-04

## 2025-04-04 RX ADMIN — PROPOFOL 50 MG: 10 INJECTION, EMULSION INTRAVENOUS at 07:04

## 2025-04-04 RX ADMIN — PROPOFOL 50 MG: 10 INJECTION, EMULSION INTRAVENOUS at 08:04

## 2025-04-04 RX ADMIN — LIDOCAINE HYDROCHLORIDE 50 MG: 20 INJECTION INTRAVENOUS at 07:04

## 2025-04-04 RX ADMIN — PROPOFOL 150 MG: 10 INJECTION, EMULSION INTRAVENOUS at 07:04

## 2025-04-04 NOTE — TRANSFER OF CARE
"Anesthesia Transfer of Care Note    Patient: Barbara Mari    Procedure(s) Performed: * No procedures listed *    Patient location: PACU    Anesthesia Type: general    Transport from OR: Transported from OR on room air with adequate spontaneous ventilation    Post pain: adequate analgesia    Post assessment: no apparent anesthetic complications    Post vital signs: stable    Level of consciousness: awake    Nausea/Vomiting: no nausea/vomiting    Complications: none    Transfer of care protocol was followed      Last vitals: Visit Vitals  /72 (BP Location: Right arm, Patient Position: Sitting)   Pulse 70   Temp 36.6 °C (97.8 °F) (Temporal)   Resp 16   Ht 5' 4" (1.626 m)   Wt 76.1 kg (167 lb 10.6 oz)   LMP 02/23/2025   SpO2 99%   Breastfeeding No   BMI 28.78 kg/m²     "

## 2025-04-04 NOTE — DISCHARGE SUMMARY
[facility] - Endoscopy 1st Fl  Discharge Note  Short Stay    Colonoscopy  EGD      OUTCOME: Patient tolerated treatment/procedure well without complication and is now ready for discharge.    DISPOSITION: Home or Self Care    FINAL DIAGNOSIS:  Crohn's disease of colon without complication    FOLLOWUP: With primary care provider    DISCHARGE INSTRUCTIONS:  No discharge procedures on file.

## 2025-04-04 NOTE — H&P
Short Stay Endoscopy History and Physical    PCP - No, Primary Doctor    Procedure - EGD and colonoscopy  ASA - per anesthesia  Mallampati - per anesthesia  History of Anesthesia problems - no  Family history Anesthesia problems -  no     HPI:  This is a 24 y.o. female here for evaluation of :   Active Hospital Problems    Diagnosis  POA    *Crohn's disease of colon without complication [K50.10]  Yes    Upper abdominal pain [R10.10]  No    Helicobacter pylori gastritis [K29.70, B96.81]  Yes      Resolved Hospital Problems   No resolved problems to display.         Health Maintenance         Date Due Completion Date    TETANUS VACCINE 08/14/2023 8/14/2013    Influenza Vaccine (1) 09/01/2024 1/20/2021    COVID-19 Vaccine (4 - 2024-25 season) 09/01/2024 6/27/2022    Chlamydia Screening 03/06/2026 3/6/2025    Pap Smear 03/06/2028 3/6/2025    RSV Vaccine (Age 60+ and Pregnant patients) (1 - 1-dose 75+ series) 07/16/2077 ---              ROS:  CONSTITUTIONAL: Denies weight change,  fatigue, fevers, chills, night sweats.  CARDIOVASCULAR: Denies chest pain, shortness of breath, orthopnea and edema.  RESPIRATORY: Denies cough, hemoptysis, dyspnea, and wheezing.  GI: See HPI.    Medical History:   Past Medical History:   Diagnosis Date    Behcet recurrent disease     Crohn's colitis     PIH (pregnancy induced hypertension)     Preeclampsia     G1    Vaginal delivery     x2       Surgical History:   Past Surgical History:   Procedure Laterality Date    COLON BIOPSY      COLONOSCOPY N/A 07/07/2020    Procedure: COLONOSCOPY;  Surgeon: Addy Gupta MD;  Location: [facility];  Service: Pediatrics;  Laterality: N/A;    ESOPHAGOGASTRODUODENOSCOPY N/A 07/07/2020    Procedure: EGD (ESOPHAGOGASTRODUODENOSCOPY);  Surgeon: Addy Gupta MD;  Location: [facility];  Service: Pediatrics;  Laterality: N/A;    TUBAL LIGATION  02/07/2024    L/S BTL       Family History:   Family History   Problem Relation Name Age of Onset    No Known  Problems Father      Diabetes Mother         Social History:   Social History[1]    Allergies:   Review of patient's allergies indicates:   Allergen Reactions    Sulfamethoxazole-trimethoprim Swelling    Atorvastatin Other (See Comments)     atorvastatin    Penicillins     Triamterene-hydrochlorothiazid      Other reaction(s): Not Indicated    Trimethoprim Hives    Amoxicillin Rash    Augmentin [amoxicillin-pot clavulanate] Rash    Clavulanic acid Rash       Medications:   Medications Ordered Prior to Encounter[2]    Physical Exam:  Vital Signs:   Vitals:    04/04/25 0654   BP: 129/72   Pulse: 70   Resp: 16   Temp: 97.8 °F (36.6 °C)     General Appearance: Well appearing in no acute distress  ENT: OP clear  Chest: CTA B  CV: RRR, no m/r/g  Abd: s/nt/nd/nabs  Ext: no edema    Labs:Reviewed    IMP:  Active Hospital Problems    Diagnosis  POA    *Crohn's disease of colon without complication [K50.10]  Yes    Upper abdominal pain [R10.10]  No    Helicobacter pylori gastritis [K29.70, B96.81]  Yes      Resolved Hospital Problems   No resolved problems to display.         Plan:   I have explained the risks and benefits of upper endoscopy and colonoscopy to the patient including but not limited to bleeding, perforation, infection, and death. The patient wishes to proceed.         [1]   Social History  Tobacco Use    Smoking status: Never    Smokeless tobacco: Never   Substance Use Topics    Alcohol use: Yes    Drug use: No   [2]   Current Outpatient Medications on File Prior to Encounter   Medication Sig Dispense Refill    atorvastatin (LIPITOR) 20 MG tablet Take 1 tablet every day by oral route at bedtime for 30 days, for cholestreol.      fluconazole (DIFLUCAN) 150 MG Tab Take 1 tablet (150 mg total) by mouth Every 3 (three) days. 2 tablet 0    acetaminophen (TYLENOL) 500 MG tablet 500 mg.      nystatin-triamcinolone (MYCOLOG) ointment Apply topically 2 (two) times daily. Apply externally for inching/irratation for 7  days 15 g 2    prenatal vit-iron fum-folic ac (PRENATAL VITAMIN WITH MINERALS) 28 mg iron- 800 mcg Tab Take 1 tablet by mouth once daily. 30 tablet 11     No current facility-administered medications on file prior to encounter.

## 2025-04-04 NOTE — ANESTHESIA POSTPROCEDURE EVALUATION
Anesthesia Post Evaluation    Patient: Barbara aMri    Procedure(s) Performed: * No procedures listed *    Final Anesthesia Type: general      Patient location during evaluation: PACU  Patient participation: Yes- Able to Participate  Level of consciousness: awake  Post-procedure vital signs: reviewed and stable  Pain management: adequate  Airway patency: patent    PONV status at discharge: No PONV  Anesthetic complications: no      Cardiovascular status: stable  Respiratory status: unassisted  Hydration status: euvolemic  Follow-up not needed.              Vitals Value Taken Time   /69 04/04/25 08:38   Temp 36.6 °C (97.8 °F) 04/04/25 08:18   Pulse 67 04/04/25 08:38   Resp 20 04/04/25 08:38   SpO2 100 % 04/04/25 08:33   Vitals shown include unfiled device data.      No case tracking events are documented in the log.      Pain/Trevor Score: Trevor Score: 10 (4/4/2025  8:38 AM)

## 2025-04-07 VITALS
HEART RATE: 67 BPM | TEMPERATURE: 98 F | DIASTOLIC BLOOD PRESSURE: 69 MMHG | SYSTOLIC BLOOD PRESSURE: 120 MMHG | RESPIRATION RATE: 20 BRPM | BODY MASS INDEX: 28.63 KG/M2 | WEIGHT: 167.69 LBS | HEIGHT: 64 IN | OXYGEN SATURATION: 100 %

## 2025-04-08 LAB
ESTROGEN SERPL-MCNC: NORMAL PG/ML
INSULIN SERPL-ACNC: NORMAL U[IU]/ML
LAB AP CLINICAL INFORMATION: NORMAL
LAB AP GROSS DESCRIPTION: NORMAL
LAB AP PERFORMING LOCATION(S): NORMAL
LAB AP REPORT FOOTNOTES: NORMAL

## 2025-04-18 ENCOUNTER — RESULTS FOLLOW-UP (OUTPATIENT)
Dept: GASTROENTEROLOGY | Facility: CLINIC | Age: 25
End: 2025-04-18
Payer: MEDICAID

## 2025-04-18 DIAGNOSIS — K29.70 HELICOBACTER PYLORI GASTRITIS: Primary | ICD-10-CM

## 2025-04-18 DIAGNOSIS — B96.81 HELICOBACTER PYLORI GASTRITIS: Primary | ICD-10-CM

## 2025-04-18 RX ORDER — TETRACYCLINE HYDROCHLORIDE 500 MG/1
500 CAPSULE ORAL EVERY 6 HOURS
Qty: 40 CAPSULE | Refills: 0 | Status: SHIPPED | OUTPATIENT
Start: 2025-04-18 | End: 2025-04-28

## 2025-04-18 RX ORDER — OMEPRAZOLE 40 MG/1
40 CAPSULE, DELAYED RELEASE ORAL
Qty: 20 CAPSULE | Refills: 0 | Status: SHIPPED | OUTPATIENT
Start: 2025-04-18 | End: 2025-04-28

## 2025-04-18 RX ORDER — METRONIDAZOLE 500 MG/1
500 TABLET ORAL EVERY 8 HOURS
Qty: 30 TABLET | Refills: 0 | Status: SHIPPED | OUTPATIENT
Start: 2025-04-18 | End: 2025-04-28

## 2025-04-30 ENCOUNTER — RESULTS FOLLOW-UP (OUTPATIENT)
Dept: GASTROENTEROLOGY | Facility: CLINIC | Age: 25
End: 2025-04-30

## 2025-05-05 DIAGNOSIS — B96.81 HELICOBACTER PYLORI GASTRITIS: Primary | ICD-10-CM

## 2025-05-05 DIAGNOSIS — K29.70 HELICOBACTER PYLORI GASTRITIS: Primary | ICD-10-CM

## 2025-05-05 RX ORDER — TETRACYCLINE HYDROCHLORIDE 250 MG/1
500 CAPSULE ORAL EVERY 6 HOURS
Qty: 80 CAPSULE | Refills: 0 | Status: SHIPPED | OUTPATIENT
Start: 2025-05-05 | End: 2025-05-15

## 2025-05-05 RX ORDER — OMEPRAZOLE 40 MG/1
40 CAPSULE, DELAYED RELEASE ORAL
Qty: 20 CAPSULE | Refills: 0 | Status: SHIPPED | OUTPATIENT
Start: 2025-05-05 | End: 2025-05-15

## 2025-05-23 RX ORDER — TETRACYCLINE HYDROCHLORIDE 500 MG/1
500 CAPSULE ORAL EVERY 6 HOURS
Qty: 40 CAPSULE | Refills: 0 | Status: SHIPPED | OUTPATIENT
Start: 2025-05-23 | End: 2025-06-02

## 2025-06-09 ENCOUNTER — PATIENT MESSAGE (OUTPATIENT)
Dept: GASTROENTEROLOGY | Facility: CLINIC | Age: 25
End: 2025-06-09
Payer: MEDICAID

## 2025-06-09 DIAGNOSIS — B96.81 HELICOBACTER PYLORI GASTRITIS: ICD-10-CM

## 2025-06-09 DIAGNOSIS — K29.70 HELICOBACTER PYLORI GASTRITIS: ICD-10-CM

## 2025-06-09 RX ORDER — OMEPRAZOLE 40 MG/1
40 CAPSULE, DELAYED RELEASE ORAL
Qty: 20 CAPSULE | Refills: 0 | Status: SHIPPED | OUTPATIENT
Start: 2025-06-09 | End: 2025-06-19

## 2025-06-09 RX ORDER — TETRACYCLINE HYDROCHLORIDE 500 MG/1
500 CAPSULE ORAL EVERY 6 HOURS
Qty: 40 CAPSULE | Refills: 0 | OUTPATIENT
Start: 2025-06-09 | End: 2025-06-19

## 2025-06-16 ENCOUNTER — PATIENT MESSAGE (OUTPATIENT)
Dept: GASTROENTEROLOGY | Facility: CLINIC | Age: 25
End: 2025-06-16
Payer: MEDICAID

## 2025-06-17 ENCOUNTER — OFFICE VISIT (OUTPATIENT)
Dept: GASTROENTEROLOGY | Facility: CLINIC | Age: 25
End: 2025-06-17
Payer: MEDICAID

## 2025-06-17 DIAGNOSIS — A04.8 H. PYLORI INFECTION: Primary | ICD-10-CM

## 2025-06-17 DIAGNOSIS — K50.10 CROHN'S DISEASE OF COLON WITHOUT COMPLICATION: ICD-10-CM

## 2025-06-17 DIAGNOSIS — M35.2 BEHCET'S DISEASE: ICD-10-CM

## 2025-06-17 PROCEDURE — 1159F MED LIST DOCD IN RCRD: CPT | Mod: CPTII,95,, | Performed by: NURSE PRACTITIONER

## 2025-06-17 PROCEDURE — 98006 SYNCH AUDIO-VIDEO EST MOD 30: CPT | Mod: 95,,, | Performed by: NURSE PRACTITIONER

## 2025-06-17 PROCEDURE — 1160F RVW MEDS BY RX/DR IN RCRD: CPT | Mod: CPTII,95,, | Performed by: NURSE PRACTITIONER

## 2025-06-17 RX ORDER — OMEPRAZOLE 40 MG/1
40 CAPSULE, DELAYED RELEASE ORAL 2 TIMES DAILY
Qty: 8 CAPSULE | Refills: 0 | Status: SHIPPED | OUTPATIENT
Start: 2025-06-17 | End: 2025-06-21

## 2025-06-17 RX ORDER — CLARITHROMYCIN 500 MG/1
500 TABLET, FILM COATED ORAL 2 TIMES DAILY
Qty: 28 TABLET | Refills: 0 | Status: SHIPPED | OUTPATIENT
Start: 2025-06-17 | End: 2025-07-01

## 2025-06-17 RX ORDER — METRONIDAZOLE 500 MG/1
500 TABLET ORAL 3 TIMES DAILY
Qty: 30 TABLET | Refills: 0 | Status: SHIPPED | OUTPATIENT
Start: 2025-06-17 | End: 2025-07-01

## 2025-06-17 NOTE — PROGRESS NOTES
"The patient location is: [place]  The chief complaint leading to consultation is: Crohn's, H. Pylori     Visit type: audiovisual    Face to Face time with patient: 15 minutes   20 minutes of total time spent on the encounter, which includes face to face time and non-face to face time preparing to see the patient (eg, review of tests), Obtaining and/or reviewing separately obtained history, Documenting clinical information in the electronic or other health record, Independently interpreting results (not separately reported) and communicating results to the patient/family/caregiver, or Care coordination (not separately reported).         Each patient to whom he or she provides medical services by telemedicine is:  (1) informed of the relationship between the physician and patient and the respective role of any other health care provider with respect to management of the patient; and (2) notified that he or she may decline to receive medical services by telemedicine and may withdraw from such care at any time.    Notes:       Clinic Follow Up:  [facility] Follow Up Note    Reason for Follow Up:  The primary encounter diagnosis was H. pylori infection. Diagnoses of Crohn's disease of colon without complication and Behcet's disease were also pertinent to this visit.    PCP: Nehal, Primary Doctor       HPI:  This is a 24 y.o. female here for follow up.     # Crohn's disease.   - Behcet's disease and Crohn's disease diagnosed in 2017.   - 11/21/17-- colonoscopy pathology report chronic active colitis with granulomas.   - was initially placed on Remicade but couldn't take off of work for infusions so was switched to Humira. She has been off medications for the last couple of years. She says she does not want to self inject as well as non-compliance (she says she has trouble remembering and is not "disciplined" enough to take it).   - she had recent EGD and colonoscopy. She is currently without active " crohn's disease. EGD with H. Pylori     # H. Pylori  - diagnosed on recent EGD  - was sent in Tetracycline, omeprazole, and metronidazole.  - her insurance did not cover tetracycline or bismuth and she says she is not able to pay much for her medications (even a few dollars). She needs treatment that is completely covered by insurance.    - she did take some of the metronidazole but stopped it when she realized she had to take all medications.   - she did not take omeprazole but has it at home.     Review of Systems    Medical History:  Past Medical History:   Diagnosis Date    Behcet recurrent disease     Crohn's colitis     PIH (pregnancy induced hypertension)     Preeclampsia     G1    Vaginal delivery     x2       Surgical History:   Past Surgical History:   Procedure Laterality Date    COLON BIOPSY      COLONOSCOPY N/A 07/07/2020    Procedure: COLONOSCOPY;  Surgeon: Addy Gupta MD;  Location: [facility] [place];  Service: Pediatrics;  Laterality: N/A;    ESOPHAGOGASTRODUODENOSCOPY N/A 07/07/2020    Procedure: EGD (ESOPHAGOGASTRODUODENOSCOPY);  Surgeon: Addy Gupta MD;  Location: [facility] [place];  Service: Pediatrics;  Laterality: N/A;    TUBAL LIGATION  02/07/2024    L/S BTL       Family History:   Family History   Problem Relation Name Age of Onset    No Known Problems Father      Diabetes Mother         Social History:   Social History[1]    Allergies:   Review of patient's allergies indicates:   Allergen Reactions    Sulfamethoxazole-trimethoprim Swelling    Atorvastatin Other (See Comments)     atorvastatin    Penicillins     Triamterene-hydrochlorothiazid      Other reaction(s): Not Indicated    Trimethoprim Hives    Amoxicillin Rash    Augmentin [amoxicillin-pot clavulanate] Rash    Clavulanic acid Rash       Home Medications:  Medications Ordered Prior to Encounter[2]    There were no vitals taken for this visit.  There is no height or weight on file to calculate BMI.  Physical Exam    Labs: Pertinent labs  reviewed.  CRC Screening:     Assessment:   1. H. pylori infection    2. Crohn's disease of colon without complication    3. Behcet's disease    Has chron's disease-- is in remission off mediation.   She has been on both Remicade and Humira. Would rather take Remicade as she feels she would be more compliant. Because she has been on Remicade in the past, would recommend dual therapy with checking early drug levels. 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 Do not want to use MTX as she is of child bearing age. Will discuss further with GI team regarding biologic use.   Will send in new H. Pylori treatment and she will need to collect stool 3 weeks after completion of treatment.     Recommendations:   - clarithromycin, Flagyl, omeprazole x 14 days.   - check H. Pylori stool test 3 weeks after completion of treatment.   - discuss CD treatment with GI team     H. pylori infection  -     clarithromycin (BIAXIN) 500 MG tablet; Take 1 tablet (500 mg total) by mouth 2 (two) times a day. for 14 days  Dispense: 28 tablet; Refill: 0  -     metroNIDAZOLE (FLAGYL) 500 MG tablet; Take 1 tablet (500 mg total) by mouth 3 (three) times daily. for 14 days  Dispense: 30 tablet; Refill: 0  -     omeprazole (PRILOSEC) 40 MG capsule; Take 1 capsule (40 mg total) by mouth 2 (two) times a day. for 4 days  Dispense: 8 capsule; Refill: 0    Crohn's disease of colon without complication    Behcet's disease      Thank you for the opportunity to participate in the care of this patient.  SAMANTHA Medina                             [1]   Social History  Tobacco Use    Smoking status: Never    Smokeless tobacco: Never   Substance Use Topics    Alcohol use: Yes    Drug use: No   [2]   Current Outpatient Medications on File Prior to Visit   Medication Sig Dispense Refill    acetaminophen (TYLENOL) 500 MG tablet 500 mg.      atorvastatin (LIPITOR) 20 MG tablet Take 1 tablet every day by oral route at bedtime for 30 days, for cholestreol.       nystatin-triamcinolone (MYCOLOG) ointment Apply topically 2 (two) times daily. Apply externally for inching/irratation for 7 days 15 g 2    omeprazole (PRILOSEC) 40 MG capsule Take 1 capsule (40 mg total) by mouth 2 (two) times daily before meals. for 10 days 20 capsule 0    prenatal vit-iron fum-folic ac (PRENATAL VITAMIN WITH MINERALS) 28 mg iron- 800 mcg Tab Take 1 tablet by mouth once daily. 30 tablet 11    [DISCONTINUED] fluconazole (DIFLUCAN) 150 MG Tab Take 1 tablet (150 mg total) by mouth Every 3 (three) days. 2 tablet 0     No current facility-administered medications on file prior to visit.

## 2025-06-18 ENCOUNTER — PATIENT MESSAGE (OUTPATIENT)
Dept: RESEARCH | Facility: HOSPITAL | Age: 25
End: 2025-06-18
Payer: MEDICAID

## 2025-06-23 ENCOUNTER — PATIENT MESSAGE (OUTPATIENT)
Dept: GASTROENTEROLOGY | Facility: CLINIC | Age: 25
End: 2025-06-23
Payer: MEDICAID

## 2025-06-23 ENCOUNTER — TELEPHONE (OUTPATIENT)
Dept: GASTROENTEROLOGY | Facility: CLINIC | Age: 25
End: 2025-06-23
Payer: MEDICAID

## 2025-06-23 NOTE — TELEPHONE ENCOUNTER
Attempted to call patient to follow up regarding whether she picked up the medications that the provider sent for her. It appears she only picked up the Flagyl. I was going to attempt to explain that the medication needs to be taken together. Patient did not answer . Sent patient a portal message.    Jaleesa Cancino, PharmD , [facility]  Clinical Pharmacist Gastroenterology  [facility]

## 2025-07-08 ENCOUNTER — PATIENT MESSAGE (OUTPATIENT)
Dept: GASTROENTEROLOGY | Facility: CLINIC | Age: 25
End: 2025-07-08
Payer: MEDICAID

## 2025-07-25 ENCOUNTER — PATIENT MESSAGE (OUTPATIENT)
Dept: GASTROENTEROLOGY | Facility: CLINIC | Age: 25
End: 2025-07-25
Payer: MEDICAID

## 2025-08-07 ENCOUNTER — LAB VISIT (OUTPATIENT)
Dept: LAB | Facility: HOSPITAL | Age: 25
End: 2025-08-07
Attending: INTERNAL MEDICINE
Payer: MEDICAID

## 2025-08-07 ENCOUNTER — PATIENT MESSAGE (OUTPATIENT)
Dept: GASTROENTEROLOGY | Facility: CLINIC | Age: 25
End: 2025-08-07
Payer: MEDICAID

## 2025-08-07 DIAGNOSIS — B96.81 HELICOBACTER PYLORI GASTRITIS: ICD-10-CM

## 2025-08-07 DIAGNOSIS — K29.70 HELICOBACTER PYLORI GASTRITIS: ICD-10-CM

## 2025-08-07 PROCEDURE — 87338 HPYLORI STOOL AG IA: CPT

## 2025-08-08 ENCOUNTER — PATIENT MESSAGE (OUTPATIENT)
Dept: GASTROENTEROLOGY | Facility: CLINIC | Age: 25
End: 2025-08-08
Payer: MEDICAID

## 2025-08-08 LAB
H. PYLORI SURFACE ANTIGEN, INTERPRETATION (OHS): POSITIVE
Lab: 3.12

## 2025-08-13 ENCOUNTER — OFFICE VISIT (OUTPATIENT)
Dept: GASTROENTEROLOGY | Facility: CLINIC | Age: 25
End: 2025-08-13
Payer: MEDICAID

## 2025-08-13 VITALS — BODY MASS INDEX: 30.05 KG/M2 | HEIGHT: 64 IN | WEIGHT: 176 LBS

## 2025-08-13 DIAGNOSIS — K50.10 CROHN'S DISEASE OF COLON WITHOUT COMPLICATION: ICD-10-CM

## 2025-08-13 DIAGNOSIS — M35.2 BEHCET'S DISEASE: ICD-10-CM

## 2025-08-13 DIAGNOSIS — A04.8 H. PYLORI INFECTION: Primary | ICD-10-CM

## 2025-08-13 PROCEDURE — 3008F BODY MASS INDEX DOCD: CPT | Mod: CPTII,95,, | Performed by: NURSE PRACTITIONER

## 2025-08-13 PROCEDURE — 1159F MED LIST DOCD IN RCRD: CPT | Mod: CPTII,95,, | Performed by: NURSE PRACTITIONER

## 2025-08-13 PROCEDURE — 1160F RVW MEDS BY RX/DR IN RCRD: CPT | Mod: CPTII,95,, | Performed by: NURSE PRACTITIONER

## 2025-08-13 PROCEDURE — 98006 SYNCH AUDIO-VIDEO EST MOD 30: CPT | Mod: 95,,, | Performed by: NURSE PRACTITIONER

## 2025-08-13 RX ORDER — LEVOFLOXACIN 500 MG/1
500 TABLET, FILM COATED ORAL DAILY
Qty: 14 TABLET | Refills: 0 | Status: SHIPPED | OUTPATIENT
Start: 2025-08-13 | End: 2025-08-27

## 2025-08-13 RX ORDER — ESOMEPRAZOLE MAGNESIUM 40 MG/1
40 CAPSULE, DELAYED RELEASE ORAL 2 TIMES DAILY
Qty: 28 CAPSULE | Refills: 0 | Status: SHIPPED | OUTPATIENT
Start: 2025-08-13 | End: 2025-08-27

## 2025-08-13 RX ORDER — METRONIDAZOLE 500 MG/1
500 TABLET ORAL 3 TIMES DAILY
Qty: 42 TABLET | Refills: 0 | Status: SHIPPED | OUTPATIENT
Start: 2025-08-13 | End: 2025-08-27